# Patient Record
Sex: FEMALE | Race: ASIAN | Employment: OTHER | URBAN - METROPOLITAN AREA
[De-identification: names, ages, dates, MRNs, and addresses within clinical notes are randomized per-mention and may not be internally consistent; named-entity substitution may affect disease eponyms.]

---

## 2017-01-01 ENCOUNTER — HOSPITAL ENCOUNTER (INPATIENT)
Facility: HOSPITAL | Age: 82
LOS: 3 days | Discharge: HOME HEALTH | DRG: 689 | End: 2017-01-01
Attending: EMERGENCY MEDICINE | Admitting: HOSPITALIST
Payer: MEDICARE

## 2017-01-01 ENCOUNTER — APPOINTMENT (OUTPATIENT)
Dept: GENERAL RADIOLOGY | Facility: HOSPITAL | Age: 82
DRG: 689 | End: 2017-01-01
Attending: EMERGENCY MEDICINE
Payer: MEDICARE

## 2017-01-01 ENCOUNTER — HOSPITAL ENCOUNTER (INPATIENT)
Facility: HOSPITAL | Age: 82
LOS: 7 days | Discharge: HOME HEALTH | DRG: 100 | End: 2017-01-01
Attending: EMERGENCY MEDICINE | Admitting: HOSPITALIST
Payer: MEDICARE

## 2017-01-01 ENCOUNTER — APPOINTMENT (OUTPATIENT)
Dept: GENERAL RADIOLOGY | Facility: HOSPITAL | Age: 82
DRG: 100 | End: 2017-01-01
Attending: EMERGENCY MEDICINE
Payer: MEDICARE

## 2017-01-01 ENCOUNTER — APPOINTMENT (OUTPATIENT)
Dept: CT IMAGING | Facility: HOSPITAL | Age: 82
DRG: 100 | End: 2017-01-01
Attending: EMERGENCY MEDICINE
Payer: MEDICARE

## 2017-01-01 ENCOUNTER — APPOINTMENT (OUTPATIENT)
Dept: CV DIAGNOSTICS | Facility: HOSPITAL | Age: 82
DRG: 689 | End: 2017-01-01
Attending: INTERNAL MEDICINE
Payer: MEDICARE

## 2017-01-01 ENCOUNTER — APPOINTMENT (OUTPATIENT)
Dept: CT IMAGING | Facility: HOSPITAL | Age: 82
DRG: 689 | End: 2017-01-01
Attending: EMERGENCY MEDICINE
Payer: MEDICARE

## 2017-01-01 ENCOUNTER — APPOINTMENT (OUTPATIENT)
Dept: CT IMAGING | Facility: HOSPITAL | Age: 82
DRG: 100 | End: 2017-01-01
Attending: HOSPITALIST
Payer: MEDICARE

## 2017-01-01 ENCOUNTER — APPOINTMENT (OUTPATIENT)
Dept: GENERAL RADIOLOGY | Facility: HOSPITAL | Age: 82
DRG: 100 | End: 2017-01-01
Attending: HOSPITALIST
Payer: MEDICARE

## 2017-01-01 VITALS
DIASTOLIC BLOOD PRESSURE: 95 MMHG | HEART RATE: 101 BPM | BODY MASS INDEX: 26.84 KG/M2 | HEIGHT: 60 IN | SYSTOLIC BLOOD PRESSURE: 152 MMHG | OXYGEN SATURATION: 93 % | TEMPERATURE: 100 F | WEIGHT: 136.69 LBS | RESPIRATION RATE: 20 BRPM

## 2017-01-01 VITALS
SYSTOLIC BLOOD PRESSURE: 156 MMHG | DIASTOLIC BLOOD PRESSURE: 60 MMHG | RESPIRATION RATE: 20 BRPM | WEIGHT: 141.63 LBS | OXYGEN SATURATION: 98 % | HEART RATE: 73 BPM | TEMPERATURE: 98 F | BODY MASS INDEX: 28 KG/M2

## 2017-01-01 DIAGNOSIS — N39.0 URINARY TRACT INFECTION WITHOUT HEMATURIA, SITE UNSPECIFIED: ICD-10-CM

## 2017-01-01 DIAGNOSIS — R53.1 WEAKNESS GENERALIZED: ICD-10-CM

## 2017-01-01 DIAGNOSIS — R41.82 ALTERED MENTAL STATUS, UNSPECIFIED ALTERED MENTAL STATUS TYPE: Primary | ICD-10-CM

## 2017-01-01 DIAGNOSIS — E87.1 HYPONATREMIA: ICD-10-CM

## 2017-01-01 DIAGNOSIS — N30.00 ACUTE CYSTITIS WITHOUT HEMATURIA: Primary | ICD-10-CM

## 2017-01-01 PROCEDURE — 99223 1ST HOSP IP/OBS HIGH 75: CPT | Performed by: HOSPITALIST

## 2017-01-01 PROCEDURE — 99223 1ST HOSP IP/OBS HIGH 75: CPT | Performed by: OTHER

## 2017-01-01 PROCEDURE — 71010 XR CHEST AP PORTABLE  (CPT=71010): CPT | Performed by: EMERGENCY MEDICINE

## 2017-01-01 PROCEDURE — 4B02XSZ MEASUREMENT OF CARDIAC PACEMAKER, EXTERNAL APPROACH: ICD-10-PCS | Performed by: INTERNAL MEDICINE

## 2017-01-01 PROCEDURE — 71020 XR CHEST PA + LAT CHEST (CPT=71020): CPT | Performed by: HOSPITALIST

## 2017-01-01 PROCEDURE — 99223 1ST HOSP IP/OBS HIGH 75: CPT | Performed by: INTERNAL MEDICINE

## 2017-01-01 PROCEDURE — 99233 SBSQ HOSP IP/OBS HIGH 50: CPT | Performed by: HOSPITALIST

## 2017-01-01 PROCEDURE — 95819 EEG AWAKE AND ASLEEP: CPT | Performed by: OTHER

## 2017-01-01 PROCEDURE — 99233 SBSQ HOSP IP/OBS HIGH 50: CPT | Performed by: OTHER

## 2017-01-01 PROCEDURE — 99232 SBSQ HOSP IP/OBS MODERATE 35: CPT | Performed by: INTERNAL MEDICINE

## 2017-01-01 PROCEDURE — 99232 SBSQ HOSP IP/OBS MODERATE 35: CPT | Performed by: OTHER

## 2017-01-01 PROCEDURE — 95816 EEG AWAKE AND DROWSY: CPT | Performed by: OTHER

## 2017-01-01 PROCEDURE — 93306 TTE W/DOPPLER COMPLETE: CPT | Performed by: INTERNAL MEDICINE

## 2017-01-01 PROCEDURE — 70450 CT HEAD/BRAIN W/O DYE: CPT | Performed by: EMERGENCY MEDICINE

## 2017-01-01 PROCEDURE — 70450 CT HEAD/BRAIN W/O DYE: CPT | Performed by: HOSPITALIST

## 2017-01-01 PROCEDURE — 99239 HOSP IP/OBS DSCHRG MGMT >30: CPT | Performed by: HOSPITALIST

## 2017-01-01 PROCEDURE — 99232 SBSQ HOSP IP/OBS MODERATE 35: CPT | Performed by: HOSPITALIST

## 2017-01-01 PROCEDURE — 99222 1ST HOSP IP/OBS MODERATE 55: CPT | Performed by: INTERNAL MEDICINE

## 2017-01-01 PROCEDURE — 99239 HOSP IP/OBS DSCHRG MGMT >30: CPT | Performed by: INTERNAL MEDICINE

## 2017-01-01 RX ORDER — MONTELUKAST SODIUM 10 MG/1
10 TABLET ORAL NIGHTLY
Status: DISCONTINUED | OUTPATIENT
Start: 2017-01-01 | End: 2017-01-01

## 2017-01-01 RX ORDER — MEMANTINE HYDROCHLORIDE 10 MG/1
10 TABLET ORAL 2 TIMES DAILY
COMMUNITY
End: 2017-01-01

## 2017-01-01 RX ORDER — POLYETHYLENE GLYCOL 3350 17 G/17G
17 POWDER, FOR SOLUTION ORAL DAILY PRN
Status: DISCONTINUED | OUTPATIENT
Start: 2017-01-01 | End: 2017-01-01

## 2017-01-01 RX ORDER — POTASSIUM CHLORIDE 14.9 MG/ML
20 INJECTION INTRAVENOUS ONCE
Status: COMPLETED | OUTPATIENT
Start: 2017-01-01 | End: 2017-01-01

## 2017-01-01 RX ORDER — PREGABALIN 100 MG/1
100 CAPSULE ORAL 2 TIMES DAILY
Status: DISCONTINUED | OUTPATIENT
Start: 2017-01-01 | End: 2017-01-01

## 2017-01-01 RX ORDER — DEXTROSE MONOHYDRATE 25 G/50ML
50 INJECTION, SOLUTION INTRAVENOUS
Status: DISCONTINUED | OUTPATIENT
Start: 2017-01-01 | End: 2017-01-01

## 2017-01-01 RX ORDER — SENNOSIDES 8.6 MG
17.2 TABLET ORAL NIGHTLY
Status: DISCONTINUED | OUTPATIENT
Start: 2017-01-01 | End: 2017-01-01

## 2017-01-01 RX ORDER — TRAZODONE HYDROCHLORIDE 50 MG/1
50 TABLET ORAL NIGHTLY PRN
Status: DISCONTINUED | OUTPATIENT
Start: 2017-01-01 | End: 2017-01-01

## 2017-01-01 RX ORDER — DILTIAZEM HYDROCHLORIDE 180 MG/1
180 CAPSULE, COATED, EXTENDED RELEASE ORAL DAILY
Status: ON HOLD | COMMUNITY
End: 2018-01-01 | Stop reason: ALTCHOICE

## 2017-01-01 RX ORDER — SODIUM CHLORIDE 9 MG/ML
INJECTION, SOLUTION INTRAVENOUS CONTINUOUS
Status: DISCONTINUED | OUTPATIENT
Start: 2017-01-01 | End: 2017-01-01

## 2017-01-01 RX ORDER — LOSARTAN POTASSIUM 50 MG/1
25 TABLET ORAL DAILY
COMMUNITY

## 2017-01-01 RX ORDER — POTASSIUM CHLORIDE 20 MEQ/1
40 TABLET, EXTENDED RELEASE ORAL ONCE
Status: COMPLETED | OUTPATIENT
Start: 2017-01-01 | End: 2017-01-01

## 2017-01-01 RX ORDER — METOPROLOL TARTRATE 5 MG/5ML
5 INJECTION INTRAVENOUS ONCE
Status: COMPLETED | OUTPATIENT
Start: 2017-01-01 | End: 2017-01-01

## 2017-01-01 RX ORDER — ENOXAPARIN SODIUM 100 MG/ML
40 INJECTION SUBCUTANEOUS NIGHTLY
Status: DISCONTINUED | OUTPATIENT
Start: 2017-01-01 | End: 2017-01-01

## 2017-01-01 RX ORDER — ACETAMINOPHEN 325 MG/1
650 TABLET ORAL EVERY 6 HOURS PRN
Status: DISCONTINUED | OUTPATIENT
Start: 2017-01-01 | End: 2017-01-01

## 2017-01-01 RX ORDER — SODIUM CHLORIDE 9 MG/ML
1000 INJECTION, SOLUTION INTRAVENOUS CONTINUOUS
Status: DISCONTINUED | OUTPATIENT
Start: 2017-01-01 | End: 2017-01-01

## 2017-01-01 RX ORDER — ASPIRIN 325 MG
325 TABLET ORAL DAILY
Status: DISCONTINUED | OUTPATIENT
Start: 2017-01-01 | End: 2017-01-01

## 2017-01-01 RX ORDER — BISACODYL 10 MG
10 SUPPOSITORY, RECTAL RECTAL
Status: DISCONTINUED | OUTPATIENT
Start: 2017-01-01 | End: 2017-01-01

## 2017-01-01 RX ORDER — METOCLOPRAMIDE HYDROCHLORIDE 5 MG/ML
10 INJECTION INTRAMUSCULAR; INTRAVENOUS EVERY 8 HOURS PRN
Status: DISCONTINUED | OUTPATIENT
Start: 2017-01-01 | End: 2017-01-01

## 2017-01-01 RX ORDER — LOSARTAN POTASSIUM 50 MG/1
50 TABLET ORAL DAILY
Status: DISCONTINUED | OUTPATIENT
Start: 2017-01-01 | End: 2017-01-01

## 2017-01-01 RX ORDER — ONDANSETRON 2 MG/ML
4 INJECTION INTRAMUSCULAR; INTRAVENOUS EVERY 6 HOURS PRN
Status: DISCONTINUED | OUTPATIENT
Start: 2017-01-01 | End: 2017-01-01

## 2017-01-01 RX ORDER — TAMSULOSIN HYDROCHLORIDE 0.4 MG/1
CAPSULE ORAL DAILY
Status: ON HOLD | COMMUNITY
End: 2018-01-01 | Stop reason: ALTCHOICE

## 2017-01-01 RX ORDER — DILTIAZEM HYDROCHLORIDE 180 MG/1
180 CAPSULE, EXTENDED RELEASE ORAL DAILY
Status: DISCONTINUED | OUTPATIENT
Start: 2017-01-01 | End: 2017-01-01

## 2017-01-01 RX ORDER — ACETAMINOPHEN 650 MG/1
650 SUPPOSITORY RECTAL EVERY 4 HOURS PRN
Status: DISCONTINUED | OUTPATIENT
Start: 2017-01-01 | End: 2017-01-01

## 2017-01-01 RX ORDER — MEMANTINE HYDROCHLORIDE 5 MG/1
5 TABLET ORAL 2 TIMES DAILY
Status: DISCONTINUED | OUTPATIENT
Start: 2017-01-01 | End: 2017-01-01

## 2017-01-01 RX ORDER — MEMANTINE HYDROCHLORIDE 10 MG/1
10 TABLET ORAL 2 TIMES DAILY
Status: DISCONTINUED | OUTPATIENT
Start: 2017-01-01 | End: 2017-01-01

## 2017-01-01 RX ORDER — HYDRALAZINE HYDROCHLORIDE 20 MG/ML
10 INJECTION INTRAMUSCULAR; INTRAVENOUS EVERY 6 HOURS PRN
Status: DISCONTINUED | OUTPATIENT
Start: 2017-01-01 | End: 2017-01-01

## 2017-01-01 RX ORDER — FUROSEMIDE 10 MG/ML
20 INJECTION INTRAMUSCULAR; INTRAVENOUS ONCE
Status: COMPLETED | OUTPATIENT
Start: 2017-01-01 | End: 2017-01-01

## 2017-01-01 RX ORDER — HYDROCHLOROTHIAZIDE 25 MG/1
25 TABLET ORAL DAILY
Status: DISCONTINUED | OUTPATIENT
Start: 2017-01-01 | End: 2017-01-01

## 2017-01-01 RX ORDER — ATORVASTATIN CALCIUM 20 MG/1
20 TABLET, FILM COATED ORAL DAILY
Status: DISCONTINUED | OUTPATIENT
Start: 2017-01-01 | End: 2017-01-01

## 2017-01-01 RX ORDER — HYDRALAZINE HYDROCHLORIDE 20 MG/ML
10 INJECTION INTRAMUSCULAR; INTRAVENOUS EVERY 4 HOURS PRN
Status: DISCONTINUED | OUTPATIENT
Start: 2017-01-01 | End: 2017-01-01

## 2017-01-01 RX ORDER — SODIUM CHLORIDE 9 MG/ML
125 INJECTION, SOLUTION INTRAVENOUS CONTINUOUS
Status: DISCONTINUED | OUTPATIENT
Start: 2017-01-01 | End: 2017-01-01

## 2017-01-01 RX ORDER — LEVETIRACETAM 500 MG/1
500 TABLET ORAL 2 TIMES DAILY
Qty: 60 TABLET | Refills: 1 | Status: SHIPPED | OUTPATIENT
Start: 2017-01-01

## 2017-01-01 RX ORDER — ONDANSETRON 2 MG/ML
8 INJECTION INTRAMUSCULAR; INTRAVENOUS EVERY 6 HOURS PRN
Status: DISCONTINUED | OUTPATIENT
Start: 2017-01-01 | End: 2017-01-01

## 2017-01-01 RX ORDER — FAMOTIDINE 20 MG/1
20 TABLET ORAL DAILY
Status: DISCONTINUED | OUTPATIENT
Start: 2017-01-01 | End: 2017-01-01

## 2017-01-01 RX ORDER — CEPHALEXIN 500 MG/1
500 CAPSULE ORAL 3 TIMES DAILY
COMMUNITY
End: 2017-01-01

## 2017-01-01 RX ORDER — SODIUM PHOSPHATE, DIBASIC AND SODIUM PHOSPHATE, MONOBASIC 7; 19 G/133ML; G/133ML
1 ENEMA RECTAL ONCE AS NEEDED
Status: DISCONTINUED | OUTPATIENT
Start: 2017-01-01 | End: 2017-01-01

## 2017-01-01 RX ORDER — FAMOTIDINE 10 MG/ML
20 INJECTION, SOLUTION INTRAVENOUS DAILY
Status: DISCONTINUED | OUTPATIENT
Start: 2017-01-01 | End: 2017-01-01

## 2017-01-01 RX ORDER — LOSARTAN POTASSIUM 25 MG/1
25 TABLET ORAL DAILY
Status: DISCONTINUED | OUTPATIENT
Start: 2017-01-01 | End: 2017-01-01

## 2017-01-01 RX ORDER — VALSARTAN 80 MG/1
80 TABLET ORAL DAILY
Qty: 30 TABLET | Refills: 0 | Status: SHIPPED | OUTPATIENT
Start: 2017-01-01 | End: 2017-01-01

## 2017-01-01 RX ORDER — LABETALOL HYDROCHLORIDE 5 MG/ML
10 INJECTION, SOLUTION INTRAVENOUS EVERY 4 HOURS PRN
Status: DISCONTINUED | OUTPATIENT
Start: 2017-01-01 | End: 2017-01-01

## 2017-01-01 RX ORDER — VALSARTAN 40 MG/1
40 TABLET ORAL DAILY
Status: ON HOLD | COMMUNITY
End: 2017-01-01

## 2017-01-01 RX ORDER — DILTIAZEM HYDROCHLORIDE 60 MG/1
60 TABLET, FILM COATED ORAL EVERY 8 HOURS SCHEDULED
Status: DISCONTINUED | OUTPATIENT
Start: 2017-01-01 | End: 2017-01-01

## 2017-01-01 RX ORDER — ALFUZOSIN HYDROCHLORIDE 10 MG/1
10 TABLET, EXTENDED RELEASE ORAL
Status: DISCONTINUED | OUTPATIENT
Start: 2017-01-01 | End: 2017-01-01

## 2017-01-01 RX ORDER — LABETALOL HYDROCHLORIDE 5 MG/ML
10 INJECTION, SOLUTION INTRAVENOUS EVERY 10 MIN PRN
Status: DISCONTINUED | OUTPATIENT
Start: 2017-01-01 | End: 2017-01-01

## 2017-01-01 RX ORDER — DOCUSATE SODIUM 100 MG/1
100 CAPSULE, LIQUID FILLED ORAL 2 TIMES DAILY
Status: DISCONTINUED | OUTPATIENT
Start: 2017-01-01 | End: 2017-01-01

## 2017-01-01 RX ORDER — ENOXAPARIN SODIUM 100 MG/ML
40 INJECTION SUBCUTANEOUS DAILY
Status: DISCONTINUED | OUTPATIENT
Start: 2017-01-01 | End: 2017-01-01

## 2017-01-01 RX ORDER — ATORVASTATIN CALCIUM 80 MG/1
80 TABLET, FILM COATED ORAL NIGHTLY
Status: DISCONTINUED | OUTPATIENT
Start: 2017-01-01 | End: 2017-01-01

## 2017-01-01 RX ORDER — ACETAMINOPHEN 325 MG/1
650 TABLET ORAL EVERY 4 HOURS PRN
Status: DISCONTINUED | OUTPATIENT
Start: 2017-01-01 | End: 2017-01-01

## 2017-01-01 RX ORDER — ATORVASTATIN CALCIUM 20 MG/1
20 TABLET, FILM COATED ORAL NIGHTLY
Status: DISCONTINUED | OUTPATIENT
Start: 2017-01-01 | End: 2017-01-01

## 2017-01-01 RX ORDER — MAGNESIUM SULFATE HEPTAHYDRATE 40 MG/ML
2 INJECTION, SOLUTION INTRAVENOUS ONCE
Status: COMPLETED | OUTPATIENT
Start: 2017-01-01 | End: 2017-01-01

## 2017-01-01 RX ORDER — ASPIRIN 300 MG
300 SUPPOSITORY, RECTAL RECTAL DAILY
Status: DISCONTINUED | OUTPATIENT
Start: 2017-01-01 | End: 2017-01-01

## 2017-10-26 PROBLEM — E87.1 HYPONATREMIA: Status: ACTIVE | Noted: 2017-01-01

## 2017-10-26 PROBLEM — R53.1 WEAKNESS GENERALIZED: Status: ACTIVE | Noted: 2017-01-01

## 2017-10-26 PROBLEM — N30.00 ACUTE CYSTITIS WITHOUT HEMATURIA: Status: ACTIVE | Noted: 2017-01-01

## 2017-10-26 NOTE — ED NOTES
Report given to MAYANK Brock. Transport paged. Pt's gown/linen changed d/t wetness. Pt in no distress.

## 2017-10-26 NOTE — ED PROVIDER NOTES
Patient Seen in: [facility] Emergency Department    History   Patient presents with:  Altered Mental Status (neurologic)  Fever (infectious)    Stated Complaint: dx with UTI; more lethargic today    HPI    29-year-old [place] ([facility]) female who is brought to (!) 190/95  Pulse: 88  Resp: 24  Temp: 98.8 °F (37.1 °C)  Temp src: Temporal  SpO2: 92 %  O2 Device: None (Room air)    Current:/95 (BP Location: Right arm)   Pulse 101   Temp 99.7 °F (37.6 °C) (Oral)   Resp 20   Ht 152.4 cm (5')   Wt 62 kg   SpO2 93 within normal limits   BASIC METABOLIC PANEL (8) - Abnormal; Notable for the following:     Glucose 105 (*)     GFR 52 (*)     Sodium 135 (*)     All other components within normal limits   POCT GLUCOSE - Abnormal; Notable for the following:     POC Glucos limits   CBC W/ DIFFERENTIAL - Abnormal; Notable for the following:     HGB 10.8 (*)     HCT 33.4 (*)     MCV 80.3 (*)     MCH 26.0 (*)     Neutrophil Absolute Prelim 8.62 (*)     Neutrophil Absolute 8.62 (*)     Monocyte Absolute 1.25 (*)     All other co    VENTRICLES/SULCI: Timothy Lout is mild to moderate atrophy with significant enlargement of ventricles are proportion to the sulci which may represent normal pressure hydrocephalus in the appropriate clinical setting.   INTRACRANIAL: [place] Lout is moderate to sever Patient will be admitted to the floor for further workup treatment and evaluation because of her generalized weakness and urinary tract infection.         Disposition and Plan     Clinical Impression:  Acute cystitis without hematuria  (primary encounter di

## 2017-10-26 NOTE — PROGRESS NOTES
NURSING ADMISSION NOTE      Patient admitted via Cart  Oriented to room. Safety precautions initiated. Bed in low position. Call light in reach. Patient can not speak english. Daughter/son/grandson at bedside. Daughter to translate.  PIV in right

## 2017-10-26 NOTE — ED NOTES
Medication list reviewed with family member. Hydrodiuril was cancelled, family no longer on this medication. New orders are pending.  Pt continues to be lethargic, awakens to verbal and tactile stimuli, but family reports pt did not receive her normal medic

## 2017-10-26 NOTE — H&P
[facility]  [facility] Physical     [facility] Patient Status:  Inpatient    1935 MRN [id number]   [facility] 4NW-A Attending Hao Head MD   Hosp Day # 0 PCP Jeff Fernandez MD     Chief Complaint: AMS    Hist MG Oral Tab Take 25 mg by mouth 2 (two) times daily. Disp:  Rfl:    Montelukast Sodium (SINGULAIR) 10 MG Oral Tab Take 10 mg by mouth nightly. Disp:  Rfl:    Pregabalin (LYRICA) 100 MG Oral Cap Take 100 mg by mouth 2 (two) times daily.  Disp:  Rfl:    Multi 1. Acute cystitis without hematuria   1. Will place on meropenem   2. Awaiting urine cx  3. Monitor for fever and WBC  4. IVF  2. Acute encephalopathy due to #1  1.  Seems to be improving with hydration   2. ? NPH on her CT Head, can consider MRI brain

## 2017-10-27 NOTE — PLAN OF CARE
GENITOURINARY - ADULT    • Absence of urinary retention Progressing        METABOLIC/FLUID AND ELECTROLYTES - ADULT    • Glucose maintained within prescribed range Progressing    • Electrolytes maintained within normal limits Progressing        MUSCULOSKEL

## 2017-10-27 NOTE — PAYOR COMM NOTE
--------------  ADMISSION REVIEW     Payor: MEDICARE Simón Leach  Subscriber #:  [id number]  Authorization Number: N/A    Admit date: 10/26/17  Admit time: 1975 Leilani Muniz       Admitting Physician: Annie Cedillo MD  Attending Physician:  Irina Espino MD  Primary CHOLECYSTECTOMY  No date: OTHER SURGICAL HISTORY      Comment: thigh and knee fx on left  No date: PACEMAKER[NB.2]    Social History:[NB.1]  reports that she has never smoked.  She has never used smokeless tobacco. She reports that she does not drink alcoho neurological deficits. CNII-XII grossly intact. Musculoskeletal: Moves all extremities. Extremities: No edema or cyanosis. Integument: No rashes or lesions. Psychiatric: Appropriate mood and affect.       Diagnostic Data:      Labs:[NB.1]  Recent Labs Date Action Dose Route User    10/26/2017 1638 New Bag 1 g Intravenous Rai WORRELL, RN      diltiazem (CARDIZEM CD) 24 hr cap 180 mg     Date Action Dose Route User    10/26/2017 1900 Given 180 mg Oral Mary Hart, RN      Enoxaparin Sodium (Lethia Stephanie infusion     Date Action Dose Route User    10/27/2017 0553 New Bag (none) Intravenous Schuyler Wood RN    73/47/6286 1815 New Bag (none) Intravenous Guera Obando RN      0.9%  NaCl infusion     Date Action Dose Route User    10/27/2017 1200 Re

## 2017-10-27 NOTE — PHYSICAL THERAPY NOTE
PT attempted to see the pt for an eval this pm, however pt recently t/f to the [facility] in [address] [place] [address] from 421 to r/o CVA. Pt now on stroke protocol and bedrest until noon on 10/28/17. RN aware. Will re-attempt when medically appropriate on 10/28/17.

## 2017-10-27 NOTE — PROGRESS NOTES
[facility] Pharmacy Note:  Renal Adjustment for Merrem (meropenem)    Loretta Wilson is a 80year old female who has been prescribed Merrem (meropenem) 500 mg every 8 hrs. CrCl is estimated creatinine clearance is 45.2 mL/min (based on SCr of 0.69 mg/dL).  so

## 2017-10-27 NOTE — SLP NOTE
ADULT SWALLOWING EVALUATION    ASSESSMENT    ASSESSMENT/OVERALL IMPRESSION:  Patient seen to assess swallow function as she was noted to have AMS and drooling. Patient daughter present and contributed to history.   Patient with history of increased oral ho incontinence    Hyponatremia    Weakness generalized      Past Medical History  Past Medical History:   Diagnosis Date   • CORONARY ARTERY DISEASE    • Dementia    • Other and unspecified hyperlipidemia    • Type II or unspecified type diabetes mellitus wi prep/hold/transit time)  Mastication: Impaired (increased time)  Retention: Intact (none)    Pharyngeal Phase of Swallow: Impaired  Laryngeal Elevation Timing: Appears impaired (suspect delay)  Laryngeal Elevation Strength: Appears intact  Laryngeal Elevat

## 2017-10-27 NOTE — PROGRESS NOTES
[facility]  Progress Note    Avatod GloriaChris Patient Status:  Inpatient    1935 MRN [id number]   [facility] 4NW-A Attending Lucy Mack MD   Hosp Day # 1 PCP Teofilo Palacio MD     CC: Altered mental status    SUBJECTIVE: atraumatic  Throat: oral mucosa moist  Neck: no adenopathy, no carotid bruit, no JVD  Lungs: clear to auscultation bilaterally  Heart: S1, S2 normal, no murmur,  regular rate and rhythm  Abdomen: soft, non-tender; bowel sounds normal  Extremities: extremit collections. No midline shift. Vascular calcification the skull base is mild. Craniocervical junction is intact. SINUSES:           No sign of acute sinusitis.   There is a 1.6 cm right sided maxillary mucous retention cyst.  MASTOIDS:          No sign o 100 mg Oral BID   Alfuzosin HCl ER (UROXATRAL) 24 hr tab 10 mg 10 mg Oral Daily with breakfast   Losartan Potassium (COZAAR) tab 25 mg 25 mg Oral Daily   glucose (DEX4) oral liquid 15 g 15 g Oral Q15 Min PRN   Or      Glucose-Vitamin C (DEX-4) 4-0.006 g ch pressure  7. Hyperlipidemia–continue statin            Quality:  · DVT Prophylaxis: SCD, subcutaneous Lovenox  · CODE status: Full  · Mcclure: no  · Central line: no    Estimated date of discharge:  To be decided  Discharge is dependent on: Clinical progress

## 2017-10-27 NOTE — CONSULTS
[facility]  Consultation Note      Bekah Chisholm Patient Status:  Inpatient    1935 MRN [id number]   [facility] [place] 7NE-A Attending Daisha Lane MD   Hosp Day # 1 PCP Hector Sandoval MD     Outpatient cardiolog infusion  Intravenous Continuous   acetaminophen (TYLENOL) tab 650 mg 650 mg Oral Q4H PRN   Or      acetaminophen (TYLENOL) 650 MG rectal suppository 650 mg 650 mg Rectal Q4H PRN   Labetalol HCl (TRANDATE) injection 10 mg 10 mg Intravenous Q10 Min PRN   as Subcutaneous Daily   Insulin Aspart Pen (NOVOLOG) 100 UNIT/ML flexpen 2-10 Units 2-10 Units Subcutaneous TID CC and HS   meropenem (MERREM) IVPB 500 mg/100 ml in 0.9% NaCl minibag 500 mg Intravenous Q12H   influenza virus vaccine (FLUAD) ages 72 years and 130 lb (59 kg)      General: frail, elderly female; non-verbal.  in no acute distress  HEENT: Extraocular movements are intact; sclerae are anicteric; scalp is atrauamatic; no thyromegaly  Neck: Supple; no JVD; no carotid bruits  Cardiac: Regular rate and

## 2017-10-27 NOTE — PLAN OF CARE
GENITOURINARY - ADULT    • Absence of urinary retention Progressing        Impaired Swallowing    • Minimize aspiration risk Progressing        METABOLIC/FLUID AND ELECTROLYTES - ADULT    • Glucose maintained within prescribed range Progressing    • Electr

## 2017-10-27 NOTE — PROGRESS NOTES
Assumed care of patient at 1200- tx from [facility] floor  AOx1- person,dementia, 2L NC, Vpaced/SR on tele  Denies pain, appears comfortable  Neuro checks q 2 hours per stroke protocol  Right arm appears slightly weaker than left arm, difficult to fully assess

## 2017-10-27 NOTE — DIETARY NOTE
NUTRITION INITIAL ASSESSMENT    Pt is at moderate nutrition risk. Pt does not meet malnutrition criteria.     NUTRITION DIAGNOSIS/PROBLEM:    Inadequate oral intake related to inability to consume sufficient energy as evidenced by NPO, hx of poor po intake Fluid Accumulation: no edema noted in MD note    NUTRITION PRESCRIPTION:  Calories: 7420-2088 calories/day (22-27 calories per kg)  Protein: 74-93 grams protein/day (1.2-1.5 grams protein per kg)  Fluid: ~1 ml/kcal or per MD discretion    MONITOR AND EVALU

## 2017-10-28 NOTE — PLAN OF CARE
Assumed care at [address] [facility]. AOx1 - self. Baseline per patient's family. Hypertensive. Labetalol x2 and Hydralazine given. BP now < 170. NSR per tele. Neuro checks as ordered. Only able to fully complete the first two with help of family to translate.  No de

## 2017-10-28 NOTE — PROGRESS NOTES
[facility]  Progress Note    Lolita Mello Patient Status:  Inpatient    1935 MRN [id number]   [facility] 4NW-A Attending Nidia Fam MD   Hosp Day # 2 PCP Tash Barber MD     CC: Altered mental status    SUBJECTIVE: P Or      acetaminophen (TYLENOL) 650 MG rectal suppository 650 mg 650 mg Rectal Q4H PRN   Labetalol HCl (TRANDATE) injection 10 mg 10 mg Intravenous Q10 Min PRN   aspirin 300 MG rectal suppository 300 mg 300 mg Rectal Daily   Or      aspirin tab 325 mg 32 Subcutaneous TID CC and HS   meropenem (MERREM) IVPB 500 mg/100 ml in 0.9% NaCl minibag 500 mg Intravenous Q12H   influenza virus vaccine (FLUAD) ages 72 years and older inj 0.5ml 0.5 mL Intramuscular Prior to discharge   Labetalol HCl (TRANDATE) injection murmur,  regular rate and rhythm  Abdomen: soft, non-tender; bowel sounds normal  Extremities: extremities normal,no cyanosis or edema  Pulses: 2+ and symmetric  Skin: Skin color, texture, turgor normal. No rashes or lesions  Neurologic: Awake,alert, demen planning for today after seen by PT OT, patient's daughter states she prefers to take patient home with home health PT if possible, after seen by PT.     Advised follow-up with his regular primary care physician Jyoti Toro MD within 1 week in office

## 2017-10-28 NOTE — PROGRESS NOTES
[id number] [facility] Progress Note    Bin Mcgraw Patient Status:  Inpatient    1935 MRN [id number]   [facility] 7NE-A Attending Pavan Pimentel MD   Hosp Day # 2 PCP Prerna Bocanegra MD         Subjective:  Riaz Hogue • aspirin  325 mg Oral Daily   • Senna  17.2 mg Oral Nightly   • docusate sodium  100 mg Oral BID   • famoTIDine  20 mg Oral Daily    Or   • famoTIDine  20 mg Intravenous Daily   • atorvastatin  20 mg Oral Daily   • DilTIAZem HCl ER Coated Beads  180 mg above note with following additons:     S:  Pt. Was seen and examined in bed this am. No acute events overnight. Lethargic today but close top baseline per daughter.      O:  BP (!) 161/69 (BP Location: Right arm)   Pulse 85   Temp 97.6 °F (36.4 °C) (Oral)

## 2017-10-28 NOTE — PLAN OF CARE
Impaired Swallowing    • Minimize aspiration risk Progressing        PAIN - ADULT    • Verbalizes/displays adequate comfort level or patient's stated pain goal Progressing        Patient/Family Goals    • Patient/Family Long Term Goal Progressing    • Tamia

## 2017-10-28 NOTE — CONSULTS
Neurology H&P    Bijal Matos Patient Status:  Inpatient    1935 MRN [id number]   [facility] 7NE-A Attending Adriana Anna MD   Hosp Day # 1 PCP Jeff Fernandez MD     Subjective:  Bijal Matos is a(n) 80year old female generalized      PMHx:  Past Medical History:   Diagnosis Date   • CORONARY ARTERY DISEASE    • Dementia    • Other and unspecified hyperlipidemia    • Type II or unspecified type diabetes mellitus without mention of complication, not stated as uncontrolle for acute infarct an MRI of the brain be recommended. 2. Enlargement of the ventricles out of proportion to the sulci which may represent normal pressure hydrocephalus in the appropriate clinical setting. Assessment:   This is an 81 y/o female w

## 2017-10-28 NOTE — PROGRESS NOTES
[facility]  Progress Note    Mechelle Rowe Patient Status:  Inpatient    1935 MRN [id number]   [facility] 7NE-A Attending Ayla Haque MD   Hosp Day # 2 PCP Jyoti Toro MD     Subjective:   Lethargic. Known Allergies    Physical Exam:   General:  Well-developed / Well-nourished. No acute distress. HEENT:  Normocephalic. Atraumatic. No icterus. Neck:  There is no jugular venous distention.    Cardiovascular:  Cardiovascular examination demonstrates a abnormal left ventricular     relaxation - grade 1 diastolic dysfunction. 2. Aortic valve: There was moderate to severe stenosis. Mild regurgitation. 3. Mitral valve: Mildly to moderately calcified annulus.  Mitral valve     demonstrates moderately thicke

## 2017-10-28 NOTE — PROGRESS NOTES
10/28/17 1829   Clinical Encounter Type   Visited With Family   Routine Visit ( responded to request for spiritual counseling.)   Continue Visiting (Son was with his mother. he said she had been sleeping all day. PT LIVES WITH HER DAUGHTER.

## 2017-10-28 NOTE — PAYOR COMM NOTE
--------------  CONTINUED STAY REVIEW    Payor: MEDICARE Simón Leach  Subscriber #:  [id number]  Authorization Number: N/A    Admit date: 10/26/17  Admit time: 1975 Leilani Muniz    Admitting Physician: Annie Cedillo MD  Attending Physician:  Irina Espino MD  [facility] 10/28/2017 0439 Given 10 mg Intravenous Desean Castillo RN      Insulin Aspart Pen (NOVOLOG) 100 UNIT/ML flexpen 2-10 Units     Date Action Dose Route User    10/27/2017 2100 Given 2 Units Subcutaneous (Left Upper Abdomen) Desean Castillo RN Procedures: 10/27/17 EEG results pending      ASSESSMENT / PLAN:      1. Acute encephalopathy  1. initially suspected 2/2 UTI however urine cx NGTD  2. Unclear etiology, calm/cooperative and oriented to self today.     3. Sodium stable at 133, afebrile, CXR

## 2017-10-28 NOTE — DISCHARGE SUMMARY
[facility]  DISCHARGE SUMMARY     Judi Le Patient Status:  Inpatient    1935 MRN [id number]   [facility] 7NE-A Attending Raiza Nava MD   Hosp Day # 3 PCP Felisha Coy MD     Date of Admission: 10/26/2017  Juan J past.  She was admitted for suspected encephalopathy 2/2 UTI. CT brain no acute findings possible enlargement of ventricles. Neurology/cardiology evaluated. She was started on meropenem IV after cultures obtained. She continued on IVF and AMS improved.  E daily. Refills:  0     LYRICA 100 MG Caps  Generic drug:  pregabalin  Notes to patient:  Hold if patient is drowsy. Take 100 mg by mouth 2 (two) times daily.    Refills:  0     Memantine HCl 10 MG Tabs  Commonly known as:  NAMENDA      Take 10 mg by 26.69 kg/m²         General appearance: alert and lethargic  Head: Normocephalic, without obvious abnormality, atraumatic  Throat: oral mucosa moist  Neck: no adenopathy, no carotid bruit, no JVD  Lungs: clear to auscultation bilaterally  Heart: S1, S2 nor is an abnormal study recorded in the awake and drowsy states showing a background slowing into the theta range and frequent triphasic waves throughout the record which is consistent with a moderate encephalopathy. Triphasic waves while not specific can be

## 2017-10-28 NOTE — PHYSICAL THERAPY NOTE
Attempted to evaluate but however pt was sleeping and drowsy. Pt's family is concerned about not having adequate support at home to take care of the patient in case if she is discharged. Pt will be attempted for evaluation if medically appropriate.

## 2017-10-28 NOTE — PROCEDURES
[facility] - ELECTROENCEPHALOGRAM (EEG) REPORT  Patient Name:  Julian Xiao   MRN / CSN:  [id number] / [id number]   Date of Birth / Age:  8/31/1935 /  80year old   Encounter Date:  10/28/17         METHODS:  Twenty-two electrodes were applied according to th slowing into the theta range and frequent triphasic waves throughout the record which is consistent with a moderate encephalopathy. Triphasic waves while not specific can be seen in encephalopathies of a toxic-metabolic, hepatic or uremic etiology.  No sherri

## 2017-10-29 NOTE — PLAN OF CARE
Assumed care at 0730. Pt A&O x1, self only. Drowsy this am, more interactive throughout day. Neuros q4. Follows commands. On room air. NSR on tele. Received PRN suppository for constipation. Up to commode; voided and had a BM. Pt denies pain.  Family at bed

## 2017-10-29 NOTE — PROGRESS NOTES
[facility]  Progress Note    Gratzkg Cueva Patient Status:  Inpatient    1935 MRN [id number]   [facility] [place] [facility] 7NE-A Attending Silvana Chatman MD   Hosp Day # 3 PCP Esmer Castillo MD     Subjective:   Much more al 10/29/17 0600   Gross per 24 hour   Intake                0 ml   Output              650 ml   Net             -650 ml       Wt Readings from Last 3 Encounters:  10/26/17 : 136 lb 11 oz (62 kg)  08/05/13 : 138 lb (62.6 kg)  07/25/13 : 130 lb (59 kg)      Al Lab  10/27/17   1332   PTP  14.1   INR  1.09       No results for input(s): TROP, CK in the last 72 hours. Tele: SR      Diagnostics:    Echo:     Conclusions:    1. Left ventricle:  The cavity size was normal. Wall thickness was normal.     Systolic

## 2017-10-29 NOTE — CM/SW NOTE
10/29/17 1600   CM/SW Referral Data   Referral Source Physician   Reason for Referral Discharge planning   Informant Children   Pertinent Medical Hx   Primary Care Physician Name dr Ary Goldmann Drug/Alcohol Use n   Major Ch

## 2017-10-29 NOTE — PLAN OF CARE
Assumed care at [address]. Extremely drowsy for initial assessment. Unable to participate in neuro assessment. Able to keep eyes open for short length of time. Unable to administer evening meds for risk of aspiration.  Patient arousability progressed slowly t

## 2017-10-29 NOTE — PHYSICAL THERAPY NOTE
PHYSICAL THERAPY EVALUATION - INPATIENT     Room Number: 9061/8073-Z  Evaluation Date: 10/29/2017  Type of Evaluation: Initial  Physician Order: PT Eval and Treat    Presenting Problem: s/p AMS 10/26/17  Reason for Therapy: Mobility Dysfunction and Dis PAIN ASSESSMENT  Ratin          COGNITION  · Overall Cognitive Status:  Impaired  · Attention Span:  difficulty attending to directions  · Following Commands:  follows one-step commands inconsistently  · Motor Planning: impaired    RANGE OF MOTION forward. Pt following commands approx 50% of the time. Pt held posture at EOB for approximately 8 minutes, then became increasingly fatigued. Performed sitting to supine with Max A for B Le support into bed and for trunk control.  Educated daughter on benef training;Strengthening;Stair training;Transfer training;Balance training  Rehab Potential : Fair  Frequency (Obs): 5x/week  Number of Visits to Meet Established Goals: 5      CURRENT GOALS    Goal #1 Patient is able to demonstrate supine - sit EOB @ level:

## 2017-10-29 NOTE — CM/SW NOTE
10/29/17 1600   Discharge disposition   Discharged to: Home-Health   Name of Facillity/Home Care/Hospice ([facility])

## 2017-10-29 NOTE — PROGRESS NOTES
[facility]  Progress Note    Cristopher Dire Patient Status:  Inpatient    1935 MRN [id number]   [facility] 4NW-A Attending Shivam Hoffman MD   Hosp Day # 3 PCP Ginny Whitfield MD     CC: Altered mental status    SUBJECTIVE: Value Date   CREATSERUM 1.01 10/29/2017   BUN 18 10/29/2017    10/29/2017   K 4.6 10/29/2017    10/29/2017   CO2 27.0 10/29/2017    10/29/2017   CA 8.7 10/29/2017   PGLU 168 10/29/2017       Imaging:  CT BRAIN OR HEAD (80018)     COMPARI in the appropriate clinical setting. Dictated by: Virginia Barrett MD on 10/26/2017 at 15:52       CHEST AP PORTABLE (CPT=71010)     TECHNIQUE:  AP chest radiograph was obtained.      COMPARISON:  [facility]I [facility]O CHEST AP PORTABLE  (CPT=71010), 7/30 atorvastatin (LIPITOR) tab 20 mg 20 mg Oral Daily   diltiazem (CARDIZEM CD) 24 hr cap 180 mg 180 mg Oral Daily   Memantine HCl (NAMENDA) tab 10 mg 10 mg Oral BID   metoprolol Tartrate (LOPRESSOR) tab 25 mg 25 mg Oral BID   Montelukast Sodium (SINGULAIR) encephalopathies of a toxic-metabolic, hepatic or uremic etiology. No clear seizures are recorded. 6. Seen by neurology.   2.  UTI at home -repeat urine culture and sensitivity in hospital with no growth, but patient's daughter states that patient was al

## 2017-12-16 PROBLEM — T83.511A URINARY TRACT INFECTION ASSOCIATED WITH INDWELLING URETHRAL CATHETER (HCC): Status: ACTIVE | Noted: 2017-01-01

## 2017-12-16 PROBLEM — R41.82 ALTERED MENTAL STATUS, UNSPECIFIED ALTERED MENTAL STATUS TYPE: Status: ACTIVE | Noted: 2017-01-01

## 2017-12-16 PROBLEM — N39.0 URINARY TRACT INFECTION ASSOCIATED WITH INDWELLING URETHRAL CATHETER (HCC): Status: ACTIVE | Noted: 2017-01-01

## 2017-12-16 PROBLEM — R41.82 ALTERED MENTAL STATUS: Status: ACTIVE | Noted: 2017-01-01

## 2017-12-16 PROBLEM — N39.0 URINARY TRACT INFECTION WITHOUT HEMATURIA, SITE UNSPECIFIED: Status: ACTIVE | Noted: 2017-01-01

## 2017-12-16 PROBLEM — R73.9 HYPERGLYCEMIA: Status: ACTIVE | Noted: 2017-01-01

## 2017-12-16 PROBLEM — D64.9 ANEMIA: Status: ACTIVE | Noted: 2017-01-01

## 2017-12-16 NOTE — CONSULTS
[facility] Dosing Service    Initial Pharmacokinetic Consult for Aminoglycoside Dosing      Will Alison is a 80year old female who is being treated for UTI. Pharmacy has been asked to dose gentamicin x 1 dose  by Dr. Alyssia Guzman.     She has No Know

## 2017-12-16 NOTE — ED PROVIDER NOTES
Patient Seen in: [facility] Emergency Department    History   Patient presents with:  Altered Mental Status (neurologic)    Stated Complaint: ALTERED LOC    HPI    Patient is an 80-year-old female presented to emergency room for evaluation of altere vital signs reviewed. All other systems reviewed and negative except as noted above.     Physical Exam   ED Triage Vitals [12/16/17 1208]  BP: 129/77  Pulse: 76  Resp: 17  Temp: (!) 97.4 °F (36.3 °C)  Temp src: Temporal  SpO2: 95 %  O2 Device: None (Ro PHOSPHORUS - Normal   AMMONIA, PLASMA - Normal   CBC WITH DIFFERENTIAL WITH PLATELET    Narrative: The following orders were created for panel order CBC WITH DIFFERENTIAL WITH PLATELET.   Procedure                               Abnormality         Sta Date: 12/16/2017  PROCEDURE:  XR CHEST AP PORTABLE (CPT=71010)  TECHNIQUE:  AP chest radiograph was obtained. COMPARISON:  [facility] XR CHEST AP PORTABLE  (CPT=71010), 10/26/2017, 14:41.   INDICATIONS:  ALTERED LOC  PATIENT STATED HISTORY: (As transcribed b (Principal)Altered mental status, unspecified altered mental status type R41.82 12/16/2017     Altered mental status R41.82 12/16/2017 Unknown    Anemia D64.9 12/16/2017 Yes    Hyperglycemia R73.9 12/16/2017 Yes    Hyponatremia E87.1 10/26/2017     Urinary

## 2017-12-16 NOTE — H&P
[facility]  History and Physical     Bekah Chisholm Patient Status:  Inpatient    1935 MRN [id number]   [facility] 4NW-A Attending Rogerio Nava MD   Hosp Day # 0 PCP Hector Sandoval MD     Chief Complaint: ams    Hist by mouth 2 (two) times daily. Disp:  Rfl:    Montelukast Sodium (SINGULAIR) 10 MG Oral Tab Take 10 mg by mouth nightly. Disp:  Rfl:    Pregabalin (LYRICA) 100 MG Oral Cap Take 100 mg by mouth 2 (two) times daily.  Disp:  Rfl:    Multiple Vitamins-Minerals ( elizabeth  2. Hyponatremia-gentle fluids  3. DMII-insulin ordered-monitor sugars  4. Cad with hx pacemaker  5. Advanced dementia  6. HTN  7. HL  8. Moderate to severe Aortic stenosis  9. Hx esbl  10.  Anemia-monitor    Quality:  · DVT Prophylaxis: lovenox  · CO

## 2017-12-17 NOTE — SLP NOTE
ADULT SWALLOWING EVALUATION    ASSESSMENT    ASSESSMENT/OVERALL IMPRESSION:  Patient admitted on 12/16/17 per admitting note,  Brenden Conley is a 80year old female with ams. Yesterday became more lethargic and sleeping more and not speaking now.   Sh liquids via spoon, cup, and straw, puree and oral medications.    Oral phase revealed poor retrieval, poor acceptance, impaired propulsion and transit of puree items with patient holding puree in her oral cavity and no attempt at clearing eventually requiri Recommendations/Plan: 24 hour care/supervision    HISTORY   MEDICAL HISTORY  Reason for Referral: Altered diet consistency; Other (Comment) (patient with advanced dementia, pocketing foods)    Problem List  Principal Problem:    Acute cystitis without hemat Impaired    Pharyngeal Phase of Swallow: Impaired  Laryngeal Elevation Timing: Appears impaired  Laryngeal Elevation Strength: Appears impaired  Laryngeal Elevation Coordination: Appears impaired  (Please note: Silent aspiration cannot be evaluated clinica

## 2017-12-17 NOTE — DIETARY NOTE
[facility]    NUTRITION INITIAL ASSESSMENT    Pt does not meet malnutrition criteria.     NUTRITION DIAGNOSIS/PROBLEM:    Inadequate oral intake related to inability to consume sufficient energy as evidenced by estimated energy intake does not meet est This is 131% of IBW  BMI: Body mass index is 25.68 kg/m².   IBW: 45.5 kg  Usual Body Wt: Unknown at this time    WEIGHT HISTORY:   Patient Weight for the past 72 hrs:   Weight   12/16/17 1208 62 kg (136 lb 11 oz)   12/16/17 1631 60.2 kg (132 lb 11.2 oz)   1

## 2017-12-17 NOTE — PROGRESS NOTES
[facility]  Progress note     Franko Stiles Patient Status:  Inpatient    1935 MRN [id number]   [facility] 4NW-A Attending Jose Greene MD   Hosp Day # 1 PCP Lexi Vazquez MD     Chief Complaint: ams  Subjective: m 75cc/hr  3. DMII-insulin ordered-monitor sugars  4. Cad with hx pacemaker  5. Advanced dementia  6. HTN  7. HL  8. Moderate to severe Aortic stenosis  9. Hx esbl  10.  Anemia-monitor    Quality:  · DVT Prophylaxis: lovenox  · CODE status: full  · [place]: ens

## 2017-12-17 NOTE — PLAN OF CARE
NEUROLOGICAL - ADULT    • Achieves stable or improved neurological status Not Progressing          GENITOURINARY - ADULT    • Absence of urinary retention Progressing        Impaired Swallowing    • Minimize aspiration risk Progressing        METABOLIC/FLU

## 2017-12-17 NOTE — PLAN OF CARE
NEUROLOGICAL - ADULT    • Achieves stable or improved neurological status Not Progressing          GENITOURINARY - ADULT    • Absence of urinary retention Progressing        Impaired Activities of Daily Living    • Achieve highest/safest level of independe

## 2017-12-17 NOTE — OCCUPATIONAL THERAPY NOTE
OCCUPATIONAL THERAPY EVALUATION - INPATIENT     Room Number: 415/415-A  Evaluation Date: 12/17/2017  Type of Evaluation: Initial  Presenting Problem:  (UTI, hyponatremia, acute encephalopathy, delirium)    Physician Order: IP Consult to Hudson Muñoz Equipment:  (No shower on first floor, giving sponge bath)             Drives: No  Patient Regularly Uses: None    Prior Level of Function:  Pt is unreliable historian, daughter present to provide info.  Pt lives with daughter and son-in-law, has 25 hr care Putting on and taking off regular lower body clothing?: A Lot  -   Bathing (including washing, rinsing, drying)?: Total  -   Toileting, which includes using toilet, bedpan or urinal? : Total  -   Putting on and taking off regular upper body clothing?: A Lo direction following, decreased strength, endurance, sitting balance. These deficits impact the patient’s ability to participate in ADL, and functional transfers.      The patient is functioning below her previous functional level and would benefit from skil

## 2017-12-17 NOTE — PAYOR COMM NOTE
--------------  ADMISSION REVIEW     Payor: MEDICARE Justin Wilks  Subscriber #:  [id number]  Authorization Number: N/A    Admit date: 12/16/17  Admit time: 8745       Admitting Physician: Madhav Stephenson MD  Attending Physician:  MD Idalia Corado (*)     RBC URINE 6-10 (*)     Bacteria Urine 1+ (*)     Mucous Urine 1+ (*)     Non-Squamous Epithelial Small (*)     All other components within normal limits   CBC W/ DIFFERENTIAL - Abnormal; Notable for the following:     HGB 11.1 (*)     HCT 32.6 (*) esbl  9.  Anemia-monitor      MEDICATIONS ADMINISTERED IN LAST 1 DAY:  CefTRIAXone Sodium (ROCEPHIN) 1 g in sodium chloride 0.9 % 100 mL IVPB-minibag/addvantage     Date Action Dose Route User    12/17/2017 1451 New Bag 1 g Intravenous Lisa Rodriguez RN

## 2017-12-17 NOTE — PHYSICAL THERAPY NOTE
PHYSICAL THERAPY EVALUATION - INPATIENT     Room Number: 415/415-A  Evaluation Date: 12/17/2017  Type of Evaluation: Initial  Physician Order: PT Eval and Treat    Presenting Problem: UTI, hyponatremia, acute encephalopathy, delirium  Reason for Therap (son-in-law)  Drives: No  Patient Owned Equipment: Rolling walker (w/c)  Patient Regularly Uses: None    Prior Level of [place]: Pt is unreliable historian, daughter present to provide info. Pt lives with daughter and son-in-law, has 24 hr care.  Gweni Sitting down on and standing up from a chair with arms (e.g., wheelchair, bedside commode, etc.): A Lot   -   Moving from lying on back to sitting on the side of the bed?: A Lot   How much help from another person does the patient currently need. ..   -   M assist for all transfers and mobility tasks. The patient is below baseline and would benefit from skilled inpatient PT to address the above deficits to assist patient in returning to prior to level of function.  Per daughter Pt receives adequate assistance

## 2017-12-18 NOTE — PLAN OF CARE
GENITOURINARY - ADULT    • Absence of urinary retention Progressing        Johnson in place and patent. Confirmed johnson was changed w/ admission as pt w/ chronic johnson. Impaired Swallowing    • Minimize aspiration risk Progressing        Pt.  Fed breakfast

## 2017-12-18 NOTE — PROGRESS NOTES
[facility]  Progress note     Lolita Mello Patient Status:  Inpatient    1935 MRN [id number]   [facility] 4NW-A Attending Carly Holm MD   Hosp Day # 2 PCP Tash Barber MD     Chief Complaint: ams  Subjective: d improving  2. Hyponatremia-gentle fluids-improved but now stable at 132 Na; turn rate down to 75cc/hr  3. DMII-insulin ordered-monitor sugars  4. constiopation-add dulcolax supp  5. Cad with hx pacemaker  6. Advanced dementia  7. HTN  8. HL  9.  Moderate to

## 2017-12-18 NOTE — CM/SW NOTE
12/18/17 1200   CM/SW Referral Data   Referral Source Social Work (self-referral)   Reason for Referral Discharge planning   Informant Patient   Patient Info   Patient's Mental Status Confused;Memory Impairments   Discharge Needs   Anticipated D/C needs

## 2017-12-18 NOTE — PROGRESS NOTES
Family at bedside at beginning of shift. Was able to arouse patient and give her some food. This RN did not witness the family feeding the patient. Needed maximum coaching to take her medications with pudding from tray. Only one evening med given.   Pt

## 2017-12-18 NOTE — SLP NOTE
SPEECH DAILY NOTE - INPATIENT    ASSESSMENT & PLAN   ASSESSMENT  Pt contacted at bedside to assess tolerance of liquids and potential to upgrade to puree solids. Per RN, pt has taken medications crushed in puree without difficulty.  RN also reports that the consistency and thin liquids without overt signs or symptoms of aspiration with 95 % accuracy over 2 session(s).   In Progress   Goal #2 The patient/family/caregiver will demonstrate understanding and implementation of aspiration precautions and swallow str

## 2017-12-19 NOTE — PLAN OF CARE
NEUROLOGICAL - ADULT    • Achieves stable or improved neurological status Not Progressing          Impaired Swallowing    • Minimize aspiration risk Progressing        METABOLIC/FLUID AND ELECTROLYTES - ADULT    • Glucose maintained within prescribed range

## 2017-12-19 NOTE — PLAN OF CARE
GENITOURINARY - ADULT    • Absence of urinary retention Progressing        Pt's johnson in place and draining vee/ yellow urine. Pt's urine culture updated w/ sensitivity. IV antibiotics adjusted.     METABOLIC/FLUID AND ELECTROLYTES - ADULT    • Glucose ma

## 2017-12-19 NOTE — PROGRESS NOTES
[facility]  Progress note     Cristopher Dire Patient Status:  Inpatient    1935 MRN [id number]   [facility] 4NW-A Attending Alyce Ramirez MD   Hosp Day # 3 PCP Ginny Whitfield MD     Chief Complaint: ams  Subjective: d encephalopathy and delirium due to cauti (present on admit)-e.coli,susceptible to ancef; will switch to ancef today; family requesting one more night  2. Hyponatremia-at baseline, chronic-stop fluids today-monitor po intake  3.  DMII-insulin ordered-monitor

## 2017-12-20 PROBLEM — N39.0 URINARY TRACT INFECTION WITHOUT HEMATURIA: Status: ACTIVE | Noted: 2017-01-01

## 2017-12-20 NOTE — CONSULTS
[facility]  Report of Consultation    Taylor Healy Patient Status:  Inpatient    1935 MRN [id number]   [facility] 4NW-A Attending Suzanne Smith MD   Hosp Day # 4 PCP Yareli Romano MD     Reason for Consultation:  hyponatre 100 mg, Oral, BID  •  Montelukast Sodium (SINGULAIR) tab 10 mg, 10 mg, Oral, Nightly  •  metoprolol Tartrate (LOPRESSOR) tab 25 mg, 25 mg, Oral, BID  •  Memantine HCl (NAMENDA) tab 10 mg, 10 mg, Oral, BID  •  atorvastatin (LIPITOR) tab 20 mg, 20 mg, Oral, lb    General: lethargic and difficult to arouse, in no distress  HEENT: No scleral icterus, MMM  Neck: Supple, no VIRA or thyromegaly  Cardiac: Regular rate and rhythm, S1, S2 normal, 2-3/6 HSM  Lungs: Clear without wheezes, rales, rhonchi.     Abdomen: Sof 0750  12/20/17   1234   PGLU  183*  157*  189*  152*  156*           Imaging:  cxr reviewed, no acute process  CT brain reviewed    Impression/Plan:    #1.   AMS- per d/w family pt has waxing and waning mental status and does frequently have days of letharg

## 2017-12-20 NOTE — DIETARY NOTE
[facility] ASSESSMENT    Pt does not meet malnutrition criteria. NUTRITION DIAGNOSIS/PROBLEM:    Inadequate oral intake related to inability to consume sufficient energy as evidenced by pt with poor intake.      NUTRITION INTE water. Son also states that there are some days that pt does not want to eat anything at all. Son does not think patient has lost weight and if she has he does not know how much.  RD and pt's son discussed oral supplements which son would like for the patie appropriate route    MEDICATIONS:  Noted    LABS:  Noted    Pt is at moderate nutrition risk    FOLLOW-UP DATE: 12/26/17    Corinne Porras MA, RD, LDN  Pager [phone]

## 2017-12-20 NOTE — PHYSICAL THERAPY NOTE
PHYSICAL THERAPY TREATMENT NOTE - INPATIENT    Room Number: 415/415-A     Session: 1   Number of Visits to Meet Established Goals: 5    Presenting Problem: UTI, hyponatremia, acute encephalopathy, delirium     History related to current admission: Pt is 8 essential hypertension        Past Surgical History  Past Surgical History:  No date: CHOLECYSTECTOMY  No date: OTHER SURGICAL HISTORY      Comment: thigh and knee fx on left  No date: PACEMAKER    SUBJECTIVE  Pt non verbal, son present to translate.  Pt sa days when she is more alert. Per family member, pt did not sleep well last night. Pt left in bed, needs met.      THERAPEUTIC EXERCISES  Lower Extremity Ankle pumps  Hip AB/AD  Heel slides  SLR     Upper Extremity Elbow flex/ext,  - open/close and PRO #5     Goal #6     Goal Comments: Goals established on 12/17/2017  All goals ongoing

## 2017-12-20 NOTE — PROGRESS NOTES
+++++++++++++++++++++++++++++++++++++++++++++++++++++++Pt very sleepy but easily awakened when name is called out;opens eys;son at bedside and fed her late breakfast;Son stated let her sleep because she did not sleep good last nite per other brother  =resp

## 2017-12-20 NOTE — PROGRESS NOTES
Drowsy earlier in shift but more awake later and able to take medications crushed with applesauce without much difficulty, continues on antibiotics, remains afebrile, no c/o pain, comfortable at this time, continue to monitor.

## 2017-12-20 NOTE — PROGRESS NOTES
[facility]  Progress Note     Fabrizio Caul Patient Status:  Inpatient    1935 MRN [id number]   [facility] 4NW-A Attending Shruti Clark MD   Hosp Day # 4 PCP Archana Vivas MD     Chief Complaint: lethargy    S: Patient switch to ancef ; CT brain with chronic small vessel disease, moderate to severe, no acute intracranial abnl  2. Hyponatremia-at baseline, chronic-monitor po intake  3. DMII-insulin ordered- controlled  4. constipation-added dulcolax supp-resolved  5.  Cad

## 2017-12-20 NOTE — SLP NOTE
Attempted to see for dysphagia tx follow up however pt found sleeping and unable to arouse sufficiently for safe po trials. Will re-attempt as available and appropriate.     Kenneth Lima MA, [id number] [facility]  Speech Language Pathologist

## 2017-12-21 NOTE — PLAN OF CARE
Achieve highest/safest level of mobility/gait Progressing     Pt unable to work with therapy today d/t pending head CT. Minimize aspiration risk Progressing     Aspiration precautions in place. No s/s of aspiration during feeding/med administration.

## 2017-12-21 NOTE — PROCEDURES
Date of Procedure: 12/21/2017    Procedure: EEG (ELECTROENCEPHALOGRAM)     DX:  AMS  HX: PT IS A 81 Y/O FEMALE ADMITTED WITH INTERMITTENT LETHARGIC AND  AMS WHO IS BEING TREATED FOR UTI. PT HAS AN INDWELLING JORDAN WHICH WAS RECENTLY CHANGED.   INFORMATION

## 2017-12-21 NOTE — OCCUPATIONAL THERAPY NOTE
Attempted to see pt for skilled OT services this date. Pt is currently awaiting CT of the brain due to AMS. Will re-attempt 12/22 as medically appropriate.  Valentino Citron, 12/21/17, 2:22 PM

## 2017-12-21 NOTE — OCCUPATIONAL THERAPY NOTE
OCCUPATIONAL THERAPY TREATMENT NOTE - INPATIENT     Room Number: 415/415-A  Session: 1   Number of Visits to Meet Established Goals: 5    Presenting Problem:  (UTI, hyponatremia, acute encephalopathy, delirium)    History related to current admission: Per ASSESSMENT  AM-PAC ‘6-Clicks’ Inpatient Daily Activity Short Form  How much help from another person does the patient currently need…  -   Putting on and taking off regular lower body clothing?: Total  -   Bathing (including washing, rinsing, drying)?: Tot from the hospital. Pt has been assesed and is able to tolerate the demands of HHOT/PT.  This would allow for increased safety and independence in the current deficit areas of: BADL/IADL dysfunction, decreased endurance, balance, strength, ROM and functional

## 2017-12-21 NOTE — SLP NOTE
SPEECH DAILY NOTE - INPATIENT    ASSESSMENT & PLAN   ASSESSMENT  Pt seen for dysphagia tx to assess tolerance with recommended diet, ensure appropriate utilization of aspiration precautions and provide pt/family education.   Pt found lying down in bed sleep Visits to Meet Established Goals: 1    Session: 2    If you have any questions, please contact Rosemary Wright

## 2017-12-21 NOTE — PROGRESS NOTES
[facility] Neurology Note    Brenden Conley Patient Status:  Inpatient    1935 MRN [id number]   [facility] 4NW-A Attending Matt Oliveros MD   Hosp Day # 5 PCP Ana lElison MD     REASON FOR CONSULTATION:    Altered M :  Medication Losartan Potassium 50 MG Oral Tab, Sig Take 50 mg by mouth daily. , Start Date , End Date , Taking?  Yes, Authorizing Provider External/Patient, Reported    Medication Atorvastatin Calcium (LIPITOR) 20 MG Oral Tab, Sig Take 20 mg by mouth daily in isotonic solution (ANCEF) 1 GM/50ML IVPB premix 1 g 1 g Intravenous Q8H   bisacodyl (DULCOLAX) rectal suppository 10 mg 10 mg Rectal Daily PRN   metoprolol Tartrate (LOPRESSOR) tab 25 mg 25 mg Oral BID   atorvastatin (LIPITOR) tab 20 mg 20 mg Oral Night all extremities,    IMAGING:    HCT 12/16:  CONCLUSION:  No acute intracranial abnormality. Stable chronic changes. As above.     Impression    This is a 80year old female with PMH of advanced dementia, hypertension, diabetes, hyperlipidemia, chronic hyp

## 2017-12-21 NOTE — PROGRESS NOTES
Remains drowsy and lethargic thru the shift, awake and alert for brief periods conversing with son and able to take medications without difficulty, back to sleep shortly after.  V.S.S., continues on IVF, no signs of pain or distress, will continue to [facility]

## 2017-12-21 NOTE — PROGRESS NOTES
[facility]  Nephrology Progress Note    Bin Mcgraw Patient Status:  Inpatient    1935 MRN [id number]   [facility] 4NW-A Attending Eli Griffin MD   Hosp Day # 5 PCP Prerna Bocanegra MD       SUBJECTIVE:  Remains very jay DBIL, TPROT    Recent Labs   Lab  12/20/17   0750  12/20/17   1234  12/20/17   1805  12/20/17   2124  12/21/17   0733   PGLU  152*  156*  125*  146*  128*       Meds:     Current Facility-Administered Medications:  DilTIAZem HCl (CARDIZEM) tab 60 mg 60 mg mildly vol depleted yesterday with dry MM although does have ongoing edema in part due to sig hypoalbuminemia. Na much better today with gentle NS overnight. Will d/c IVF and follow     #3. UTI- due to e coli.   abx per primary service        Ebony Littlejohn

## 2017-12-22 NOTE — PROGRESS NOTES
[facility]  Progress Note     Eri Ascencio Patient Status:  Inpatient    1935 MRN [id number]   [facility] 4NW-A Attending Edmund Green MD   Hosp Day # 6 PCP Christina Felix MD     Chief Complaint: lethargy    S: Patient • Insulin Aspart Pen  1-5 Units Subcutaneous TID CC and HS       ASSESSMENT / PLAN:     1.  Acute encephalopathy and delirium due to cauti (present on admit)-e.coli,susceptible to ancef; will switch to ancef ; CT brain with chronic small vessel disease, m

## 2017-12-22 NOTE — PROGRESS NOTES
[facility]  Progress Note     Julian  Patient Status:  Inpatient    1935 MRN [id number]   [facility] 4NW-A Attending Ary Gaston MD   Hosp Day # 6 PCP Tammy Lewis MD     Chief Complaint: lethargy    S: Patient Insulin Aspart Pen  1-5 Units Subcutaneous TID CC and HS       ASSESSMENT / PLAN:     1.  Acute encephalopathy and delirium due to cauti (present on admit)-e.coli,susceptible to ancef; will switch to ancef ; CT brain with chronic small vessel disease, moder

## 2017-12-22 NOTE — CM/SW NOTE
Sent updated information to [facility]. Informed them the pt may dc over the weekend.

## 2017-12-22 NOTE — PLAN OF CARE
In bed resting,still lethargic but waking more & responding more to staff today. Still with very poor oral intake. Refuses to open her mouth when helped during meals although awake & alert during feedings. Kept comfortable,clean & dry. NO glycemic reactions no

## 2017-12-22 NOTE — PROGRESS NOTES
Pt remains drowsy and lethargic this shift, opens eyes when name called but goes back to sleep, more awake with son present and able to eat and take meds without difficulty, seizure precaution maintained without activity, continues on keppra, no signs of d

## 2017-12-22 NOTE — SLP NOTE
Attempted to see patient for dysphagia therapy. She was sleeping soundly when this therapist entered the room. She was difficult to arouse to a state in order for patient to take po presentations to participate in therapy. Will attempt as schedule permits.

## 2017-12-22 NOTE — PROGRESS NOTES
[facility]  Nephrology Progress Note    Sharnatalia Back Patient Status:  Inpatient    1935 MRN [id number]   [facility] 4NW-A Attending Mery Ferguson MD   Hosp Day # 6 PCP Stephy Gimenez MD       SUBJECTIVE:  Remains lethargic ALPHOS, TBIL, DBIL, TPROT    Recent Labs   Lab  12/21/17   1152  12/21/17   1739  12/21/17   2102  12/22/17   0657  12/22/17   1159   PGLU  130*  126*  105*  116*  123*       Meds:     Current Facility-Administered Medications:  potassium chloride 40 mEq i the low 130s. Na remains stable     #3. UTI- due to e coli.   abx per primary service        Leda Rodriguez MD  12/22/2017  1:57 PM

## 2017-12-22 NOTE — PROGRESS NOTES
[id number] [facility] Progress Note    Missy Hoover Patient Status:  Inpatient    1935 MRN [id number]   [facility] 4NW-A Attending Karissa Grace MD   Hosp Day # 6 PCP Jonathan Boateng MD     CC:  AMS    Subjective:  Andrei extra-axial fluid collection. Dilatation of the lateral and 3rd ventricles is noted. This is stable when compared to prior examination. This may be due to normal pressure hydrocephalus. No evidence of an acute intracranial process.          Assessment/P CN 2-12 intact   Motor: does not move to command but moving all extremities spontaneously  Sensory: intact to noxious stimuli throughout   Coord: unable to do   Romberg: deferred  Gait: deferred    Imaging: no new imaging; prior imaging as noted above

## 2017-12-22 NOTE — PAYOR COMM NOTE
--------------  CONTINUED STAY REVIEW    Payor: MEDICARE PART B ONLY    12/20        Reason for Consultation:  hyponatremia     History of Present Illness:  Afsaneh Ruiz is a a(n) 80year old woman with mult med probs incl dementia, hyponatremia in t

## 2017-12-23 NOTE — PLAN OF CARE
Patient's son here & fed patient. Consumed 100% of ensure. Few spoonfuls of food taken in. MOre awake & responsive now,conversing with family at bedside. Remains on IV antibiotics for UTI & Keppra for seizures. Seizure precautions observed. Fall precautions obse

## 2017-12-23 NOTE — CM/SW NOTE
Pt may DC home tonight. RN will contact [facility] ambulance [phone] to arrange ambulance if medically cleared for discharge. PCS form completed. MCA form completed on [employer] website, copy to chart.

## 2017-12-23 NOTE — PLAN OF CARE
Impaired Activities of Daily Living    • Achieve highest/safest level of independence in self care Adequate for Discharge        Impaired Functional Mobility    • Achieve highest/safest level of mobility/gait Adequate for Discharge          GENITOURINARY -

## 2017-12-23 NOTE — PROGRESS NOTES
[facility]  Nephrology Progress Note    Lolita Mello Attending:  Sabi Stafford MD       Assessment and Plan:    2) AMS- fluctuating MS due to combination of dementia, poor nutrition, UTI, etc. No other clear reversible process.  EEG noted- on Kepp imaging studies reviewed.     Meds:     Current Facility-Administered Medications:  traZODone (DESYREL) partial tablet 25 mg 25 mg Oral Nightly PRN   levETIRAcetam (KEPPRA) 500 mg in sodium chloride 0.9 % 100 mL IVPB 500 mg Intravenous Q12H   DilTIAZem HCl

## 2017-12-24 NOTE — PROGRESS NOTES
NURSING DISCHARGE NOTE    Discharged Home via Ambulance. Accompanied by Family member  Belongings Taken by patient/family. Ambulance here discharge instructions and concerns addressed, patient stable and comfortable.

## 2017-12-24 NOTE — PLAN OF CARE
Son came in to see pt and talked with dr. Pino Pelaez. Son in agreence to taking pt home. Ambulance called and will be picking up pt at [address]. Pt alert to self, follow some commands from the son. Iv discontinued. Family waiting at home for pt.  Discharge instru

## 2017-12-27 NOTE — DISCHARGE SUMMARY
[facility]  DISCHARGE SUMMARY     Rockford Fearing Patient Status:  Inpatient    1935 MRN [id number]   [facility] 4NW-A Attending No att. providers found   Hosp Day # 7 PCP Yaneth Marquis MD     Date of Admission:  descriptions):   •     Lab/Test results pending at Discharge:   ·     Consultants:  • Neurology    Discharge Medication List:     Discharge Medications      START taking these medications      Instructions Prescription details   levETIRAcetam 500 MG Tabs  C week        Vital signs:       Physical Exam:    General: No acute distress. Respiratory: Clear to auscultation bilaterally. No wheezes. No rhonchi. Cardiovascular: S1, S2. Regular rate and rhythm. No murmurs, rubs or gallops.    Abdomen: Soft, nontender

## 2018-01-01 ENCOUNTER — APPOINTMENT (OUTPATIENT)
Dept: CT IMAGING | Facility: HOSPITAL | Age: 83
DRG: 871 | End: 2018-01-01
Attending: EMERGENCY MEDICINE
Payer: MEDICARE

## 2018-01-01 ENCOUNTER — HOSPITAL ENCOUNTER (INPATIENT)
Facility: HOSPITAL | Age: 83
LOS: 2 days | DRG: 871 | End: 2018-01-01
Attending: HOSPITALIST | Admitting: HOSPITALIST
Payer: COMMERCIAL

## 2018-01-01 ENCOUNTER — APPOINTMENT (OUTPATIENT)
Dept: CT IMAGING | Facility: HOSPITAL | Age: 83
DRG: 884 | End: 2018-01-01
Attending: EMERGENCY MEDICINE
Payer: MEDICARE

## 2018-01-01 ENCOUNTER — APPOINTMENT (OUTPATIENT)
Dept: GENERAL RADIOLOGY | Facility: HOSPITAL | Age: 83
DRG: 871 | End: 2018-01-01
Attending: NURSE PRACTITIONER
Payer: MEDICARE

## 2018-01-01 ENCOUNTER — APPOINTMENT (OUTPATIENT)
Dept: GENERAL RADIOLOGY | Facility: HOSPITAL | Age: 83
DRG: 884 | End: 2018-01-01
Attending: EMERGENCY MEDICINE
Payer: MEDICARE

## 2018-01-01 ENCOUNTER — HOSPITAL ENCOUNTER (INPATIENT)
Facility: HOSPITAL | Age: 83
LOS: 3 days | Discharge: HOME HEALTH | DRG: 884 | End: 2018-01-01
Attending: EMERGENCY MEDICINE | Admitting: HOSPITALIST
Payer: MEDICARE

## 2018-01-01 ENCOUNTER — HOSPITAL ENCOUNTER (INPATIENT)
Facility: HOSPITAL | Age: 83
LOS: 4 days | Discharge: HOSPICE | DRG: 871 | End: 2018-01-01
Attending: EMERGENCY MEDICINE | Admitting: HOSPITALIST
Payer: MEDICARE

## 2018-01-01 ENCOUNTER — APPOINTMENT (OUTPATIENT)
Dept: GENERAL RADIOLOGY | Facility: HOSPITAL | Age: 83
DRG: 871 | End: 2018-01-01
Attending: EMERGENCY MEDICINE
Payer: MEDICARE

## 2018-01-01 VITALS
DIASTOLIC BLOOD PRESSURE: 61 MMHG | OXYGEN SATURATION: 100 % | WEIGHT: 113.69 LBS | TEMPERATURE: 98 F | HEIGHT: 60 IN | RESPIRATION RATE: 18 BRPM | BODY MASS INDEX: 22.32 KG/M2 | SYSTOLIC BLOOD PRESSURE: 146 MMHG | HEART RATE: 79 BPM

## 2018-01-01 VITALS
SYSTOLIC BLOOD PRESSURE: 78 MMHG | HEART RATE: 42 BPM | RESPIRATION RATE: 24 BRPM | TEMPERATURE: 99 F | HEIGHT: 63 IN | WEIGHT: 122.56 LBS | OXYGEN SATURATION: 78 % | DIASTOLIC BLOOD PRESSURE: 57 MMHG | BODY MASS INDEX: 21.71 KG/M2

## 2018-01-01 VITALS
TEMPERATURE: 98 F | RESPIRATION RATE: 16 BRPM | OXYGEN SATURATION: 92 % | DIASTOLIC BLOOD PRESSURE: 40 MMHG | HEART RATE: 112 BPM | SYSTOLIC BLOOD PRESSURE: 61 MMHG

## 2018-01-01 DIAGNOSIS — A41.89 SEPSIS DUE TO OTHER ETIOLOGY (HCC): Primary | ICD-10-CM

## 2018-01-01 DIAGNOSIS — N39.0 URINARY TRACT INFECTION ASSOCIATED WITH INDWELLING URETHRAL CATHETER, INITIAL ENCOUNTER (HCC): Primary | ICD-10-CM

## 2018-01-01 DIAGNOSIS — T83.511A URINARY TRACT INFECTION ASSOCIATED WITH INDWELLING URETHRAL CATHETER, INITIAL ENCOUNTER (HCC): Primary | ICD-10-CM

## 2018-01-01 DIAGNOSIS — K63.1 PERFORATION BOWEL (HCC): ICD-10-CM

## 2018-01-01 DIAGNOSIS — R10.9 ABDOMINAL PAIN OF UNKNOWN ETIOLOGY: ICD-10-CM

## 2018-01-01 DIAGNOSIS — F03.90 DEMENTIA WITHOUT BEHAVIORAL DISTURBANCE, UNSPECIFIED DEMENTIA TYPE (HCC): ICD-10-CM

## 2018-01-01 DIAGNOSIS — R41.82 ALTERED MENTAL STATUS, UNSPECIFIED ALTERED MENTAL STATUS TYPE: ICD-10-CM

## 2018-01-01 LAB
ALBUMIN SERPL-MCNC: 1.4 G/DL (ref 3.5–4.8)
ALBUMIN SERPL-MCNC: 1.8 G/DL (ref 3.5–4.8)
ALBUMIN SERPL-MCNC: 1.9 G/DL (ref 3.5–4.8)
ALBUMIN SERPL-MCNC: 2.1 G/DL (ref 3.5–4.8)
ALBUMIN/GLOB SERPL: 0.4 {RATIO} (ref 1–2)
ALBUMIN/GLOB SERPL: 0.7 {RATIO} (ref 1–2)
ALLENS TEST: POSITIVE
ALP LIVER SERPL-CCNC: 105 U/L (ref 55–142)
ALP LIVER SERPL-CCNC: 128 U/L (ref 55–142)
ALP LIVER SERPL-CCNC: 77 U/L (ref 55–142)
ALP LIVER SERPL-CCNC: 79 U/L (ref 55–142)
ALT SERPL-CCNC: 10 U/L (ref 14–54)
ALT SERPL-CCNC: 11 U/L (ref 14–54)
ALT SERPL-CCNC: 16 U/L (ref 14–54)
ALT SERPL-CCNC: 9 U/L (ref 14–54)
AMPHET UR QL SCN: NEGATIVE
ANION GAP SERPL CALC-SCNC: 11 MMOL/L (ref 0–18)
ANION GAP SERPL CALC-SCNC: 13 MMOL/L (ref 0–18)
ANION GAP SERPL CALC-SCNC: 18 MMOL/L (ref 0–18)
ANION GAP SERPL CALC-SCNC: 7 MMOL/L (ref 0–18)
ANION GAP SERPL CALC-SCNC: 7 MMOL/L (ref 0–18)
ANION GAP SERPL CALC-SCNC: 9 MMOL/L (ref 0–18)
APTT PPP: 28.1 SECONDS (ref 26.1–34.6)
APTT PPP: 29.2 SECONDS (ref 26.1–34.6)
APTT PPP: 33.3 SECONDS (ref 26.1–34.6)
ARTERIAL BLD GAS O2 SATURATION: 97 % (ref 92–100)
ARTERIAL BLOOD GAS BASE EXCESS: -9.1
ARTERIAL BLOOD GAS HCO3: 14.2 MEQ/L (ref 22–26)
ARTERIAL BLOOD GAS PCO2: 24 MM HG (ref 35–45)
ARTERIAL BLOOD GAS PH: 7.4 (ref 7.35–7.45)
ARTERIAL BLOOD GAS PO2: 146 MM HG (ref 80–105)
AST SERPL-CCNC: 17 U/L (ref 15–41)
AST SERPL-CCNC: 18 U/L (ref 15–41)
AST SERPL-CCNC: 19 U/L (ref 15–41)
AST SERPL-CCNC: 25 U/L (ref 15–41)
ATRIAL RATE: 104 BPM
ATRIAL RATE: 77 BPM
BAND %: 1 %
BARBITURATES UR QL SCN: NEGATIVE
BASOPHIL % MANUAL: 0 %
BASOPHIL % MANUAL: 0 %
BASOPHIL ABSOLUTE MANUAL: 0 X10(3) UL (ref 0–0.1)
BASOPHIL ABSOLUTE MANUAL: 0 X10(3) UL (ref 0–0.1)
BASOPHILS # BLD AUTO: 0.02 X10(3) UL (ref 0–0.1)
BASOPHILS # BLD AUTO: 0.04 X10(3) UL (ref 0–0.1)
BASOPHILS # BLD AUTO: 0.04 X10(3) UL (ref 0–0.1)
BASOPHILS NFR BLD AUTO: 0.1 %
BENZODIAZ UR QL SCN: NEGATIVE
BILIRUB SERPL-MCNC: 0.4 MG/DL (ref 0.1–2)
BILIRUB SERPL-MCNC: 0.4 MG/DL (ref 0.1–2)
BILIRUB SERPL-MCNC: 0.6 MG/DL (ref 0.1–2)
BILIRUB SERPL-MCNC: 0.7 MG/DL (ref 0.1–2)
BILIRUB UR QL STRIP.AUTO: NEGATIVE
BILIRUB UR QL STRIP.AUTO: NEGATIVE
BUN BLD-MCNC: 16 MG/DL (ref 8–20)
BUN BLD-MCNC: 22 MG/DL (ref 8–20)
BUN BLD-MCNC: 22 MG/DL (ref 8–20)
BUN BLD-MCNC: 23 MG/DL (ref 8–20)
BUN BLD-MCNC: 26 MG/DL (ref 8–20)
BUN BLD-MCNC: 9 MG/DL (ref 8–20)
BUN/CREAT SERPL: 10.4 (ref 10–20)
BUN/CREAT SERPL: 11.7 (ref 10–20)
BUN/CREAT SERPL: 13.3 (ref 10–20)
BUN/CREAT SERPL: 17.6 (ref 10–20)
BUN/CREAT SERPL: 20 (ref 10–20)
BUN/CREAT SERPL: 22.6 (ref 10–20)
CALCIUM BLD-MCNC: 7.3 MG/DL (ref 8.3–10.3)
CALCIUM BLD-MCNC: 7.6 MG/DL (ref 8.3–10.3)
CALCIUM BLD-MCNC: 8.1 MG/DL (ref 8.3–10.3)
CALCIUM BLD-MCNC: 8.1 MG/DL (ref 8.3–10.3)
CALCIUM BLD-MCNC: 8.6 MG/DL (ref 8.3–10.3)
CALCIUM BLD-MCNC: 9.2 MG/DL (ref 8.3–10.3)
CALCULATED O2 SATURATION: 99 % (ref 92–100)
CANNABINOIDS UR QL SCN: NEGATIVE
CARBOXYHEMOGLOBIN: 1.4 % SAT (ref 0–3)
CHLORIDE SERPL-SCNC: 104 MMOL/L (ref 101–111)
CHLORIDE SERPL-SCNC: 110 MMOL/L (ref 101–111)
CHLORIDE SERPL-SCNC: 93 MMOL/L (ref 101–111)
CHLORIDE SERPL-SCNC: 95 MMOL/L (ref 101–111)
CO2 SERPL-SCNC: 16 MMOL/L (ref 22–32)
CO2 SERPL-SCNC: 16 MMOL/L (ref 22–32)
CO2 SERPL-SCNC: 19 MMOL/L (ref 22–32)
CO2 SERPL-SCNC: 20 MMOL/L (ref 22–32)
CO2 SERPL-SCNC: 24 MMOL/L (ref 22–32)
CO2 SERPL-SCNC: 28 MMOL/L (ref 22–32)
COCAINE UR QL: NEGATIVE
COLOR UR AUTO: YELLOW
CREAT BLD-MCNC: 0.77 MG/DL (ref 0.55–1.02)
CREAT BLD-MCNC: 0.8 MG/DL (ref 0.55–1.02)
CREAT BLD-MCNC: 1.15 MG/DL (ref 0.55–1.02)
CREAT BLD-MCNC: 1.25 MG/DL (ref 0.55–1.02)
CREAT BLD-MCNC: 1.65 MG/DL (ref 0.55–1.02)
CREAT BLD-MCNC: 2.21 MG/DL (ref 0.55–1.02)
EOSINOPHIL # BLD AUTO: 0 X10(3) UL (ref 0–0.3)
EOSINOPHIL % MANUAL: 1 %
EOSINOPHIL % MANUAL: 1 %
EOSINOPHIL ABSOLUTE MANUAL: 0.17 X10(3) UL (ref 0–0.3)
EOSINOPHIL ABSOLUTE MANUAL: 0.22 X10(3) UL (ref 0–0.3)
EOSINOPHIL NFR BLD AUTO: 0 %
ERYTHROCYTE [DISTWIDTH] IN BLOOD BY AUTOMATED COUNT: 15.1 % (ref 11.5–16)
ERYTHROCYTE [DISTWIDTH] IN BLOOD BY AUTOMATED COUNT: 15.1 % (ref 11.5–16)
ERYTHROCYTE [DISTWIDTH] IN BLOOD BY AUTOMATED COUNT: 15.8 % (ref 11.5–16)
ERYTHROCYTE [DISTWIDTH] IN BLOOD BY AUTOMATED COUNT: 15.9 % (ref 11.5–16)
ERYTHROCYTE [DISTWIDTH] IN BLOOD BY AUTOMATED COUNT: 15.9 % (ref 11.5–16)
EST. AVERAGE GLUCOSE BLD GHB EST-MCNC: 157 MG/DL (ref 68–126)
ETHANOL UR-MCNC: NEGATIVE MG/DL
ETHYL ALCOHOL: <3 MG/DL (ref ?–3)
GLOBULIN PLAS-MCNC: 3.2 G/DL (ref 2.5–4)
GLOBULIN PLAS-MCNC: 4 G/DL (ref 2.5–4)
GLOBULIN PLAS-MCNC: 4.5 G/DL (ref 2.5–3.7)
GLOBULIN PLAS-MCNC: 4.5 G/DL (ref 2.5–4)
GLUCOSE BLD-MCNC: 102 MG/DL (ref 65–99)
GLUCOSE BLD-MCNC: 109 MG/DL (ref 65–99)
GLUCOSE BLD-MCNC: 110 MG/DL (ref 65–99)
GLUCOSE BLD-MCNC: 113 MG/DL (ref 65–99)
GLUCOSE BLD-MCNC: 122 MG/DL (ref 65–99)
GLUCOSE BLD-MCNC: 133 MG/DL (ref 65–99)
GLUCOSE BLD-MCNC: 136 MG/DL (ref 65–99)
GLUCOSE BLD-MCNC: 140 MG/DL (ref 65–99)
GLUCOSE BLD-MCNC: 149 MG/DL (ref 65–99)
GLUCOSE BLD-MCNC: 152 MG/DL (ref 65–99)
GLUCOSE BLD-MCNC: 153 MG/DL (ref 65–99)
GLUCOSE BLD-MCNC: 157 MG/DL (ref 65–99)
GLUCOSE BLD-MCNC: 162 MG/DL (ref 70–99)
GLUCOSE BLD-MCNC: 167 MG/DL (ref 65–99)
GLUCOSE BLD-MCNC: 168 MG/DL (ref 70–99)
GLUCOSE BLD-MCNC: 170 MG/DL (ref 65–99)
GLUCOSE BLD-MCNC: 171 MG/DL (ref 65–99)
GLUCOSE BLD-MCNC: 181 MG/DL (ref 70–99)
GLUCOSE BLD-MCNC: 181 MG/DL (ref 70–99)
GLUCOSE BLD-MCNC: 199 MG/DL (ref 65–99)
GLUCOSE BLD-MCNC: 220 MG/DL (ref 65–99)
GLUCOSE BLD-MCNC: 61 MG/DL (ref 65–99)
GLUCOSE BLD-MCNC: 73 MG/DL (ref 70–99)
GLUCOSE BLD-MCNC: 75 MG/DL (ref 65–99)
GLUCOSE BLD-MCNC: 76 MG/DL (ref 65–99)
GLUCOSE BLD-MCNC: 78 MG/DL (ref 65–99)
GLUCOSE BLD-MCNC: 84 MG/DL (ref 65–99)
GLUCOSE BLD-MCNC: 86 MG/DL (ref 65–99)
GLUCOSE BLD-MCNC: 87 MG/DL (ref 65–99)
GLUCOSE BLD-MCNC: 87 MG/DL (ref 65–99)
GLUCOSE BLD-MCNC: 88 MG/DL (ref 65–99)
GLUCOSE BLD-MCNC: 90 MG/DL (ref 65–99)
GLUCOSE BLD-MCNC: 91 MG/DL (ref 65–99)
GLUCOSE BLD-MCNC: 93 MG/DL (ref 65–99)
GLUCOSE BLD-MCNC: 98 MG/DL (ref 65–99)
GLUCOSE BLD-MCNC: 98 MG/DL (ref 70–99)
GLUCOSE UR STRIP.AUTO-MCNC: NEGATIVE MG/DL
GLUCOSE UR STRIP.AUTO-MCNC: NEGATIVE MG/DL
HAV IGM SER QL: 1.5 MG/DL (ref 1.8–2.5)
HAV IGM SER QL: 2 MG/DL (ref 1.8–2.5)
HBA1C MFR BLD HPLC: 7.1 % (ref ?–5.7)
HCT VFR BLD AUTO: 26.8 % (ref 34–50)
HCT VFR BLD AUTO: 28.9 % (ref 34–50)
HCT VFR BLD AUTO: 29.7 % (ref 34–50)
HCT VFR BLD AUTO: 32 % (ref 34–50)
HCT VFR BLD AUTO: 36.3 % (ref 34–50)
HGB BLD-MCNC: 10.1 G/DL (ref 12–16)
HGB BLD-MCNC: 10.4 G/DL (ref 12–16)
HGB BLD-MCNC: 11.8 G/DL (ref 12–16)
HGB BLD-MCNC: 9.2 G/DL (ref 12–16)
HGB BLD-MCNC: 9.4 G/DL (ref 12–16)
IMMATURE GRANULOCYTE COUNT: 0.09 X10(3) UL (ref 0–1)
IMMATURE GRANULOCYTE COUNT: 0.22 X10(3) UL (ref 0–1)
IMMATURE GRANULOCYTE COUNT: 0.25 X10(3) UL (ref 0–1)
IMMATURE GRANULOCYTE RATIO %: 0.4 %
IMMATURE GRANULOCYTE RATIO %: 0.6 %
IMMATURE GRANULOCYTE RATIO %: 0.9 %
INR BLD: 1.13 (ref 0.9–1.1)
INR BLD: 1.48 (ref 0.9–1.1)
IONIZED CALCIUM: 1.16 MMOL/L (ref 1.12–1.32)
KETONES UR STRIP.AUTO-MCNC: NEGATIVE MG/DL
KETONES UR STRIP.AUTO-MCNC: NEGATIVE MG/DL
L/M: 2 L/MIN
LACTIC ACID ARTERIAL: 5.4 MMOL/L (ref 0.5–2)
LACTIC ACID: 1.8 MMOL/L (ref 0.5–2)
LACTIC ACID: 2.7 MMOL/L (ref 0.5–2)
LACTIC ACID: 4.7 MMOL/L (ref 0.5–2)
LACTIC ACID: 6.5 MMOL/L (ref 0.5–2)
LACTIC ACID: 7.2 MMOL/L (ref 0.5–2)
LYMPHOCYTE % MANUAL: 6 %
LYMPHOCYTE % MANUAL: 7 %
LYMPHOCYTE ABSOLUTE MANUAL: 1.02 X10(3) UL (ref 0.9–4)
LYMPHOCYTE ABSOLUTE MANUAL: 1.55 X10(3) UL (ref 0.9–4)
LYMPHOCYTES # BLD AUTO: 0.54 X10(3) UL (ref 0.9–4)
LYMPHOCYTES # BLD AUTO: 0.58 X10(3) UL (ref 0.9–4)
LYMPHOCYTES # BLD AUTO: 0.65 X10(3) UL (ref 0.9–4)
LYMPHOCYTES NFR BLD AUTO: 1.6 %
LYMPHOCYTES NFR BLD AUTO: 2.4 %
LYMPHOCYTES NFR BLD AUTO: 2.5 %
M PROTEIN MFR SERPL ELPH: 5.3 G/DL (ref 6.1–8.3)
M PROTEIN MFR SERPL ELPH: 5.4 G/DL (ref 6.1–8.3)
M PROTEIN MFR SERPL ELPH: 6.3 G/DL (ref 6.1–8.3)
M PROTEIN MFR SERPL ELPH: 6.4 G/DL (ref 6.1–8.3)
MCH RBC QN AUTO: 26 PG (ref 27–33.2)
MCH RBC QN AUTO: 26.9 PG (ref 27–33.2)
MCH RBC QN AUTO: 26.9 PG (ref 27–33.2)
MCH RBC QN AUTO: 27.5 PG (ref 27–33.2)
MCH RBC QN AUTO: 27.7 PG (ref 27–33.2)
MCHC RBC AUTO-ENTMCNC: 32.5 G/DL (ref 31–37)
MCHC RBC AUTO-ENTMCNC: 34 G/DL (ref 31–37)
MCHC RBC AUTO-ENTMCNC: 34.3 G/DL (ref 31–37)
MCV RBC AUTO: 79.2 FL (ref 81–100)
MCV RBC AUTO: 80.1 FL (ref 81–100)
MCV RBC AUTO: 80.2 FL (ref 81–100)
MCV RBC AUTO: 82.9 FL (ref 81–100)
MCV RBC AUTO: 85.1 FL (ref 81–100)
METAMYELOCYTE %: 1 %
METAMYELOCYTE ABSOLUTE MANUAL: 0.22 X10(3) UL (ref ?–0.01)
METHEMOGLOBIN: 0.6 % SAT (ref 0.4–1.5)
MONOCYTE % MANUAL: 2 %
MONOCYTE % MANUAL: 6 %
MONOCYTE ABSOLUTE MANUAL: 0.34 X10(3) UL (ref 0.1–1)
MONOCYTE ABSOLUTE MANUAL: 1.33 X10(3) UL (ref 0.1–1)
MONOCYTES # BLD AUTO: 0.41 X10(3) UL (ref 0.1–1)
MONOCYTES # BLD AUTO: 0.95 X10(3) UL (ref 0.1–1)
MONOCYTES # BLD AUTO: 1.23 X10(3) UL (ref 0.1–1)
MONOCYTES NFR BLD AUTO: 1.9 %
MONOCYTES NFR BLD AUTO: 3.5 %
MONOCYTES NFR BLD AUTO: 3.5 %
MORPHOLOGY: NORMAL
NEUTROPHIL ABS PRELIM: 13.88 X10 (3) UL (ref 1.3–6.7)
NEUTROPHIL ABS PRELIM: 18.41 X10 (3) UL (ref 1.3–6.7)
NEUTROPHIL ABS PRELIM: 20.54 X10 (3) UL (ref 1.3–6.7)
NEUTROPHIL ABS PRELIM: 25.56 X10 (3) UL (ref 1.3–6.7)
NEUTROPHIL ABS PRELIM: 33.33 X10 (3) UL (ref 1.3–6.7)
NEUTROPHIL ABSOLUTE MANUAL: 15.47 X10(3) UL (ref 1.3–6.7)
NEUTROPHIL ABSOLUTE MANUAL: 18.79 X10(3) UL (ref 1.3–6.7)
NEUTROPHILS # BLD AUTO: 20.54 X10(3) UL (ref 1.3–6.7)
NEUTROPHILS # BLD AUTO: 25.56 X10(3) UL (ref 1.3–6.7)
NEUTROPHILS # BLD AUTO: 33.33 X10(3) UL (ref 1.3–6.7)
NEUTROPHILS % MANUAL: 84 %
NEUTROPHILS % MANUAL: 91 %
NEUTROPHILS NFR BLD AUTO: 93.1 %
NEUTROPHILS NFR BLD AUTO: 94.2 %
NEUTROPHILS NFR BLD AUTO: 95.1 %
NITRITE UR QL STRIP.AUTO: NEGATIVE
NITRITE UR QL STRIP.AUTO: NEGATIVE
OPIATE URINE: NEGATIVE
OSMOLALITY SERPL CALC.SUM OF ELEC: 274 MOSM/KG (ref 275–295)
OSMOLALITY SERPL CALC.SUM OF ELEC: 276 MOSM/KG (ref 275–295)
OSMOLALITY SERPL CALC.SUM OF ELEC: 280 MOSM/KG (ref 275–295)
OSMOLALITY SERPL CALC.SUM OF ELEC: 281 MOSM/KG (ref 275–295)
OSMOLALITY SERPL CALC.SUM OF ELEC: 283 MOSM/KG (ref 275–295)
OSMOLALITY SERPL CALC.SUM OF ELEC: 291 MOSM/KG (ref 275–295)
P AXIS: 2 DEGREES
P-R INTERVAL: 144 MS
P-R INTERVAL: 156 MS
P/F RATIO: 704.6 MMHG
PATIENT TEMPERATURE: 97.9 F
PCP URINE: NEGATIVE
PH UR STRIP.AUTO: 5 [PH] (ref 4.5–8)
PH UR STRIP.AUTO: 5 [PH] (ref 4.5–8)
PHOSPHATE SERPL-MCNC: 2.7 MG/DL (ref 2.5–4.9)
PHOSPHATE SERPL-MCNC: 4.9 MG/DL (ref 2.5–4.9)
PLATELET # BLD AUTO: 239 10(3)UL (ref 150–450)
PLATELET # BLD AUTO: 302 10(3)UL (ref 150–450)
PLATELET # BLD AUTO: 340 10(3)UL (ref 150–450)
PLATELET # BLD AUTO: 341 10(3)UL (ref 150–450)
PLATELET # BLD AUTO: 406 10(3)UL (ref 150–450)
PLATELET MORPHOLOGY: NORMAL
PLATELET MORPHOLOGY: NORMAL
POTASSIUM BLOOD GAS: 4.1 MMOL/L (ref 3.6–5.1)
POTASSIUM SERPL-SCNC: 3.3 MMOL/L (ref 3.6–5.1)
POTASSIUM SERPL-SCNC: 3.7 MMOL/L (ref 3.6–5.1)
POTASSIUM SERPL-SCNC: 4 MMOL/L (ref 3.6–5.1)
POTASSIUM SERPL-SCNC: 4 MMOL/L (ref 3.6–5.1)
POTASSIUM SERPL-SCNC: 4.7 MMOL/L (ref 3.6–5.1)
POTASSIUM SERPL-SCNC: 4.9 MMOL/L (ref 3.6–5.1)
PROCALCITONIN SERPL-MCNC: 53.58 NG/ML
PROT UR STRIP.AUTO-MCNC: 100 MG/DL
PROT UR STRIP.AUTO-MCNC: NEGATIVE MG/DL
PSA SERPL DL<=0.01 NG/ML-MCNC: 15 SECONDS (ref 12.4–14.7)
PSA SERPL DL<=0.01 NG/ML-MCNC: 18.5 SECONDS (ref 12.4–14.7)
Q-T INTERVAL: 342 MS
Q-T INTERVAL: 374 MS
QRS DURATION: 66 MS
QRS DURATION: 72 MS
QTC CALCULATION (BEZET): 423 MS
QTC CALCULATION (BEZET): 449 MS
R AXIS: -31 DEGREES
R AXIS: 214 DEGREES
RBC # BLD AUTO: 3.34 X10(6)UL (ref 3.8–5.1)
RBC # BLD AUTO: 3.61 X10(6)UL (ref 3.8–5.1)
RBC # BLD AUTO: 3.75 X10(6)UL (ref 3.8–5.1)
RBC # BLD AUTO: 3.76 X10(6)UL (ref 3.8–5.1)
RBC # BLD AUTO: 4.38 X10(6)UL (ref 3.8–5.1)
RBC #/AREA URNS AUTO: >10 /HPF
RED CELL DISTRIBUTION WIDTH-SD: 43.2 FL (ref 35.1–46.3)
RED CELL DISTRIBUTION WIDTH-SD: 44.2 FL (ref 35.1–46.3)
RED CELL DISTRIBUTION WIDTH-SD: 46.1 FL (ref 35.1–46.3)
RED CELL DISTRIBUTION WIDTH-SD: 48.1 FL (ref 35.1–46.3)
RED CELL DISTRIBUTION WIDTH-SD: 50 FL (ref 35.1–46.3)
SODIUM BLOOD GAS: 126 MMOL/L (ref 136–144)
SODIUM SERPL-SCNC: 128 MMOL/L (ref 136–144)
SODIUM SERPL-SCNC: 129 MMOL/L (ref 136–144)
SODIUM SERPL-SCNC: 133 MMOL/L (ref 136–144)
SODIUM SERPL-SCNC: 134 MMOL/L (ref 136–144)
SODIUM SERPL-SCNC: 135 MMOL/L (ref 136–144)
SODIUM SERPL-SCNC: 139 MMOL/L (ref 136–144)
SP GR UR STRIP.AUTO: 1.01 (ref 1–1.03)
SP GR UR STRIP.AUTO: 1.01 (ref 1–1.03)
T AXIS: 182 DEGREES
T AXIS: 37 DEGREES
TOTAL CELLS COUNTED: 100
TOTAL CELLS COUNTED: 100
TOTAL HEMOGLOBIN: 11.3 G/DL (ref 11.7–16)
TROPONIN I SERPL-MCNC: <0.046 NG/ML (ref ?–0.05)
UROBILINOGEN UR STRIP.AUTO-MCNC: <2 MG/DL
UROBILINOGEN UR STRIP.AUTO-MCNC: <2 MG/DL
VENTRICULAR RATE: 104 BPM
VENTRICULAR RATE: 77 BPM
WBC # BLD AUTO: 17 X10(3) UL (ref 4–13)
WBC # BLD AUTO: 21.6 X10(3) UL (ref 4–13)
WBC # BLD AUTO: 22.1 X10(3) UL (ref 4–13)
WBC # BLD AUTO: 27.5 X10(3) UL (ref 4–13)
WBC # BLD AUTO: 35.4 X10(3) UL (ref 4–13)
WBC #/AREA URNS AUTO: >50 /HPF
WBC CLUMPS UR QL AUTO: PRESENT
WBC CLUMPS UR QL AUTO: PRESENT

## 2018-01-01 PROCEDURE — 70450 CT HEAD/BRAIN W/O DYE: CPT | Performed by: EMERGENCY MEDICINE

## 2018-01-01 PROCEDURE — 71045 X-RAY EXAM CHEST 1 VIEW: CPT | Performed by: EMERGENCY MEDICINE

## 2018-01-01 PROCEDURE — 74176 CT ABD & PELVIS W/O CONTRAST: CPT | Performed by: EMERGENCY MEDICINE

## 2018-01-01 PROCEDURE — 99223 1ST HOSP IP/OBS HIGH 75: CPT | Performed by: HOSPITALIST

## 2018-01-01 PROCEDURE — 99232 SBSQ HOSP IP/OBS MODERATE 35: CPT | Performed by: HOSPITALIST

## 2018-01-01 PROCEDURE — 99356 PROLONGED SERV,INPATIENT,1ST HR: CPT | Performed by: CLINICAL NURSE SPECIALIST

## 2018-01-01 PROCEDURE — 99232 SBSQ HOSP IP/OBS MODERATE 35: CPT | Performed by: INTERNAL MEDICINE

## 2018-01-01 PROCEDURE — 99221 1ST HOSP IP/OBS SF/LOW 40: CPT | Performed by: CLINICAL NURSE SPECIALIST

## 2018-01-01 PROCEDURE — 99233 SBSQ HOSP IP/OBS HIGH 50: CPT | Performed by: CLINICAL NURSE SPECIALIST

## 2018-01-01 PROCEDURE — 99232 SBSQ HOSP IP/OBS MODERATE 35: CPT | Performed by: CLINICAL NURSE SPECIALIST

## 2018-01-01 PROCEDURE — 99221 1ST HOSP IP/OBS SF/LOW 40: CPT | Performed by: NURSE PRACTITIONER

## 2018-01-01 PROCEDURE — 99497 ADVNCD CARE PLAN 30 MIN: CPT | Performed by: NURSE PRACTITIONER

## 2018-01-01 PROCEDURE — 99231 SBSQ HOSP IP/OBS SF/LOW 25: CPT | Performed by: HOSPITALIST

## 2018-01-01 PROCEDURE — 99223 1ST HOSP IP/OBS HIGH 75: CPT | Performed by: COLON & RECTAL SURGERY

## 2018-01-01 PROCEDURE — 99291 CRITICAL CARE FIRST HOUR: CPT | Performed by: NURSE PRACTITIONER

## 2018-01-01 PROCEDURE — 99231 SBSQ HOSP IP/OBS SF/LOW 25: CPT | Performed by: NURSE PRACTITIONER

## 2018-01-01 PROCEDURE — 71045 X-RAY EXAM CHEST 1 VIEW: CPT | Performed by: NURSE PRACTITIONER

## 2018-01-01 PROCEDURE — 99239 HOSP IP/OBS DSCHRG MGMT >30: CPT | Performed by: HOSPITALIST

## 2018-01-01 RX ORDER — LORAZEPAM 2 MG/ML
0.5 INJECTION INTRAMUSCULAR EVERY 4 HOURS PRN
Status: DISCONTINUED | OUTPATIENT
Start: 2018-01-01 | End: 2018-01-01

## 2018-01-01 RX ORDER — ENOXAPARIN SODIUM 100 MG/ML
30 INJECTION SUBCUTANEOUS NIGHTLY
Status: DISCONTINUED | OUTPATIENT
Start: 2018-01-01 | End: 2018-01-01

## 2018-01-01 RX ORDER — DEXTROSE AND SODIUM CHLORIDE 5; .9 G/100ML; G/100ML
INJECTION, SOLUTION INTRAVENOUS CONTINUOUS
Status: DISCONTINUED | OUTPATIENT
Start: 2018-01-01 | End: 2018-01-01

## 2018-01-01 RX ORDER — LORAZEPAM 2 MG/ML
1 INJECTION INTRAMUSCULAR EVERY 4 HOURS PRN
Status: DISCONTINUED | OUTPATIENT
Start: 2018-01-01 | End: 2018-01-01

## 2018-01-01 RX ORDER — MORPHINE SULFATE 4 MG/ML
2 INJECTION, SOLUTION INTRAMUSCULAR; INTRAVENOUS EVERY 2 HOUR PRN
Status: DISCONTINUED | OUTPATIENT
Start: 2018-01-01 | End: 2018-01-01

## 2018-01-01 RX ORDER — SODIUM CHLORIDE 9 MG/ML
125 INJECTION, SOLUTION INTRAVENOUS CONTINUOUS
Status: DISCONTINUED | OUTPATIENT
Start: 2018-01-01 | End: 2018-01-01

## 2018-01-01 RX ORDER — ALBUMIN (HUMAN) 12.5 G/50ML
50 SOLUTION INTRAVENOUS ONCE
Status: COMPLETED | OUTPATIENT
Start: 2018-01-01 | End: 2018-01-01

## 2018-01-01 RX ORDER — POTASSIUM CHLORIDE 14.9 MG/ML
20 INJECTION INTRAVENOUS ONCE
Status: COMPLETED | OUTPATIENT
Start: 2018-01-01 | End: 2018-01-01

## 2018-01-01 RX ORDER — SODIUM CHLORIDE 9 MG/ML
INJECTION, SOLUTION INTRAVENOUS CONTINUOUS
Status: DISCONTINUED | OUTPATIENT
Start: 2018-01-01 | End: 2018-01-01

## 2018-01-01 RX ORDER — ACETAMINOPHEN 325 MG/1
650 TABLET ORAL EVERY 6 HOURS PRN
Status: DISCONTINUED | OUTPATIENT
Start: 2018-01-01 | End: 2018-01-01

## 2018-01-01 RX ORDER — LORAZEPAM 2 MG/ML
2 INJECTION INTRAMUSCULAR EVERY 4 HOURS PRN
Status: DISCONTINUED | OUTPATIENT
Start: 2018-01-01 | End: 2018-01-01

## 2018-01-01 RX ORDER — GLYCOPYRROLATE 0.2 MG/ML
0.2 INJECTION, SOLUTION INTRAMUSCULAR; INTRAVENOUS
Status: DISCONTINUED | OUTPATIENT
Start: 2018-01-01 | End: 2018-01-01

## 2018-01-01 RX ORDER — MORPHINE SULFATE 4 MG/ML
2 INJECTION, SOLUTION INTRAMUSCULAR; INTRAVENOUS EVERY 2 HOUR PRN
Status: CANCELLED | OUTPATIENT
Start: 2018-01-01

## 2018-01-01 RX ORDER — DEXTROSE MONOHYDRATE 25 G/50ML
50 INJECTION, SOLUTION INTRAVENOUS
Status: DISCONTINUED | OUTPATIENT
Start: 2018-01-01 | End: 2018-01-01

## 2018-01-01 RX ORDER — LORAZEPAM 2 MG/ML
1 INJECTION INTRAMUSCULAR EVERY 4 HOURS PRN
Status: CANCELLED | OUTPATIENT
Start: 2018-01-01

## 2018-01-01 RX ORDER — METOCLOPRAMIDE HYDROCHLORIDE 5 MG/ML
5 INJECTION INTRAMUSCULAR; INTRAVENOUS EVERY 8 HOURS PRN
Status: DISCONTINUED | OUTPATIENT
Start: 2018-01-01 | End: 2018-01-01

## 2018-01-01 RX ORDER — CEPHALEXIN 500 MG/1
500 CAPSULE ORAL EVERY 8 HOURS SCHEDULED
Status: DISCONTINUED | OUTPATIENT
Start: 2018-01-01 | End: 2018-01-01

## 2018-01-01 RX ORDER — ONDANSETRON 2 MG/ML
4 INJECTION INTRAMUSCULAR; INTRAVENOUS EVERY 6 HOURS PRN
Status: DISCONTINUED | OUTPATIENT
Start: 2018-01-01 | End: 2018-01-01

## 2018-01-01 RX ORDER — MORPHINE SULFATE 4 MG/ML
1 INJECTION, SOLUTION INTRAMUSCULAR; INTRAVENOUS EVERY 2 HOUR PRN
Status: DISCONTINUED | OUTPATIENT
Start: 2018-01-01 | End: 2018-01-01

## 2018-01-01 RX ORDER — ACETAMINOPHEN 650 MG/1
650 SUPPOSITORY RECTAL EVERY 6 HOURS PRN
Status: DISCONTINUED | OUTPATIENT
Start: 2018-01-01 | End: 2018-01-01

## 2018-01-01 RX ORDER — ONDANSETRON 2 MG/ML
4 INJECTION INTRAMUSCULAR; INTRAVENOUS EVERY 4 HOURS PRN
Status: DISCONTINUED | OUTPATIENT
Start: 2018-01-01 | End: 2018-01-01

## 2018-01-01 RX ORDER — ONDANSETRON 2 MG/ML
4 INJECTION INTRAMUSCULAR; INTRAVENOUS EVERY 6 HOURS PRN
Status: CANCELLED | OUTPATIENT
Start: 2018-01-01

## 2018-01-01 RX ORDER — LORAZEPAM 2 MG/ML
2 INJECTION INTRAMUSCULAR EVERY 4 HOURS PRN
Status: CANCELLED | OUTPATIENT
Start: 2018-01-01

## 2018-01-01 RX ORDER — CEPHALEXIN 500 MG/1
500 CAPSULE ORAL EVERY 12 HOURS SCHEDULED
Status: DISCONTINUED | OUTPATIENT
Start: 2018-01-01 | End: 2018-01-01 | Stop reason: DRUGHIGH

## 2018-01-01 RX ORDER — METOCLOPRAMIDE HYDROCHLORIDE 5 MG/ML
10 INJECTION INTRAMUSCULAR; INTRAVENOUS EVERY 8 HOURS PRN
Status: DISCONTINUED | OUTPATIENT
Start: 2018-01-01 | End: 2018-01-01

## 2018-01-01 RX ORDER — LORAZEPAM 2 MG/ML
0.5 INJECTION INTRAMUSCULAR EVERY 4 HOURS PRN
Status: CANCELLED | OUTPATIENT
Start: 2018-01-01

## 2018-01-01 RX ORDER — MORPHINE SULFATE 4 MG/ML
1 INJECTION, SOLUTION INTRAMUSCULAR; INTRAVENOUS EVERY 2 HOUR PRN
Status: CANCELLED | OUTPATIENT
Start: 2018-01-01

## 2018-01-01 RX ORDER — ALBUMIN (HUMAN) 12.5 G/50ML
SOLUTION INTRAVENOUS
Status: COMPLETED
Start: 2018-01-01 | End: 2018-01-01

## 2018-01-01 RX ORDER — MORPHINE SULFATE 4 MG/ML
1-2 INJECTION, SOLUTION INTRAMUSCULAR; INTRAVENOUS EVERY 2 HOUR PRN
Status: DISCONTINUED | OUTPATIENT
Start: 2018-01-01 | End: 2018-01-01

## 2018-01-01 RX ORDER — GLYCOPYRROLATE 0.2 MG/ML
0.2 INJECTION, SOLUTION INTRAMUSCULAR; INTRAVENOUS
Status: CANCELLED | OUTPATIENT
Start: 2018-01-01

## 2018-01-01 RX ORDER — SODIUM CHLORIDE, SODIUM LACTATE, POTASSIUM CHLORIDE, CALCIUM CHLORIDE 600; 310; 30; 20 MG/100ML; MG/100ML; MG/100ML; MG/100ML
INJECTION, SOLUTION INTRAVENOUS CONTINUOUS
Status: DISCONTINUED | OUTPATIENT
Start: 2018-01-01 | End: 2018-01-01

## 2018-01-01 RX ORDER — HYDRALAZINE HYDROCHLORIDE 20 MG/ML
10 INJECTION INTRAMUSCULAR; INTRAVENOUS EVERY 6 HOURS PRN
Status: DISCONTINUED | OUTPATIENT
Start: 2018-01-01 | End: 2018-01-01

## 2018-01-01 RX ORDER — ACETAMINOPHEN 650 MG/1
650 SUPPOSITORY RECTAL EVERY 6 HOURS PRN
Status: CANCELLED | OUTPATIENT
Start: 2018-01-01

## 2018-01-01 RX ORDER — MAGNESIUM SULFATE HEPTAHYDRATE 40 MG/ML
2 INJECTION, SOLUTION INTRAVENOUS ONCE
Status: COMPLETED | OUTPATIENT
Start: 2018-01-01 | End: 2018-01-01

## 2018-01-01 RX ORDER — ENOXAPARIN SODIUM 100 MG/ML
40 INJECTION SUBCUTANEOUS NIGHTLY
Status: DISCONTINUED | OUTPATIENT
Start: 2018-01-01 | End: 2018-01-01

## 2018-01-01 RX ORDER — LEVETIRACETAM 500 MG/1
500 TABLET ORAL 2 TIMES DAILY
Status: DISCONTINUED | OUTPATIENT
Start: 2018-01-01 | End: 2018-01-01

## 2018-01-01 RX ORDER — SODIUM CHLORIDE 9 MG/ML
INJECTION, SOLUTION INTRAVENOUS ONCE
Status: COMPLETED | OUTPATIENT
Start: 2018-01-01 | End: 2018-01-01

## 2018-07-28 PROBLEM — T83.511A URINARY TRACT INFECTION ASSOCIATED WITH INDWELLING URETHRAL CATHETER (HCC): Status: ACTIVE | Noted: 2018-01-01

## 2018-07-28 PROBLEM — T83.511A URINARY TRACT INFECTION ASSOCIATED WITH INDWELLING URETHRAL CATHETER, INITIAL ENCOUNTER (HCC): Status: ACTIVE | Noted: 2018-01-01

## 2018-07-28 PROBLEM — N39.0 URINARY TRACT INFECTION ASSOCIATED WITH INDWELLING URETHRAL CATHETER, INITIAL ENCOUNTER (HCC): Status: ACTIVE | Noted: 2018-01-01

## 2018-07-28 PROBLEM — N39.0 URINARY TRACT INFECTION ASSOCIATED WITH INDWELLING URETHRAL CATHETER (HCC): Status: ACTIVE | Noted: 2018-01-01

## 2018-07-28 NOTE — ED INITIAL ASSESSMENT (HPI)
Patient here via EMS with c/o altered mental status, lethargy, decreased PO intake and dark urine output X 2-3 days. Patient does have history of dementia, usually alert and oriented X 2-3. EMS found patient to be 89% on RA, does not wear home 02.

## 2018-07-28 NOTE — ED PROVIDER NOTES
Patient Seen in: [facility] Emergency Department    History   Patient presents with:  Altered Mental Status (neurologic)  Dehydration (metabolic/constitutional)    Stated Complaint: altered, probable UTI, dehyrdation     HPI    55-year-old female wa °F (36.9 °C) (Temporal)   Resp 17   Wt 64 kg   SpO2 100%   BMI 27.56 kg/m²         Physical Exam    HEENT : NCAT, right eye with some deviation noted laterally , PEERL, throat clear, neck supple, no JVD, trachea midline, No LAD  Heart: S1S2 normal.  Systol components within normal limits   POCT GLUCOSE - Abnormal; Notable for the following:     POC Glucose 153 (*)     All other components within normal limits   CBC W/ DIFFERENTIAL - Abnormal; Notable for the following:     WBC 22.1 (*)     RBC 3.75 (*)     H anterior temporal cortical atrophy. There is diffuse ventriculomegaly which may be secondary to ex vacuo phenomenon or NPH.   There is periventricular decreased white matter attenuation which may be edema associated with NPH or diffuse chronic ischemic tim disease with interstitial scarring and bronchial wall thickening. Mild degenerative changes of the spine. CONCLUSION:  No acute cardiopulmonary process.      Dictated by: Yadira Holguin MD on 7/28/2018 at 15:21     Approved by: Yadira Holguin MD

## 2018-07-29 NOTE — PLAN OF CARE
GASTROINTESTINAL - ADULT    • Maintains adequate nutritional intake (undernourished) Not Progressing          GENITOURINARY - ADULT    • Absence of urinary retention Progressing        METABOLIC/FLUID AND ELECTROLYTES - ADULT    • Glucose maintained within

## 2018-07-29 NOTE — PROGRESS NOTES
[facility] Pharmacy Note: Renal dose adjustment for Enoxaparin (Lovenox)  Joyce Givens has been prescribed Enoxaparin (Lovenox)  40 mg subcutaneously every 24 hours. Estimated Creatinine Clearance: 27.1 mL/min (A) (based on SCr of 1.15 mg/dL (H)).     He

## 2018-07-29 NOTE — PLAN OF CARE
GASTROINTESTINAL - ADULT    • Maintains adequate nutritional intake (undernourished) Not Progressing          GASTROINTESTINAL - ADULT    • Maintains adequate nutritional intake (undernourished) Not Progressing          METABOLIC/FLUID AND ELECTROLYTES - A

## 2018-07-29 NOTE — PROGRESS NOTES
[facility] Pharmacy Note:  Renal Dose Adjustment for Enoxaparin (LOVENOX)    Deyanira Cooper has been prescribed Enoxaparin (LOVENOX) 30 mg subcutaneously every 24 hours. Estimated Creatinine Clearance: 38.9 mL/min (based on SCr of 0.8 mg/dL). - improved.

## 2018-07-29 NOTE — H&P
[facility]  History and Physical     Burnadette Siemens Patient Status:  Emergency    1935 MRN [id number]   Location [facility] Attending Graciela Christopher MD   Hosp Day # 0 PCP Bria Garcia MD     Chief Compla 24 Hr Take 180 mg by mouth daily. Disp:  Rfl:    tamsulosin HCl 0.4 MG Oral Cap Take by mouth daily. Disp:  Rfl:    Atorvastatin Calcium (LIPITOR) 20 MG Oral Tab Take 20 mg by mouth daily.  Disp:  Rfl:    MetFORMIN HCl (GLUCOPHAGE) 500 MG Oral Tab Take 500 Estimated Creatinine Clearance: 27.1 mL/min (A) (based on SCr of 1.15 mg/dL (H)). Recent Labs   Lab  07/28/18   1613   PTP  15.0*   INR  1.13*       Recent Labs   Lab  07/28/18 1613   TROP  <0.046       Imaging: Imaging data reviewed in Epic.

## 2018-07-29 NOTE — ED NOTES
[facility] Pt is resting on cart with IV fluids infusing. No signs of distress noted.

## 2018-07-29 NOTE — PROGRESS NOTES
Pharmacy Note: Renal dose adjustment for Metoclopramide (Reglan)  Bekah Chisholm has been prescribed Metoclopramide (Reglan) 10 mg every 8 hours as needed. Estimated Creatinine Clearance: 27.1 mL/min (A) (based on SCr of 1.15 mg/dL (H)).     Her calc

## 2018-07-29 NOTE — PROGRESS NOTES
[facility]  Progress Note     Pillo Mannysona Patient Status:  Inpatient    1935 MRN [id number]   [facility] 4NW-A Attending Siddhartha Robledo MD   Hosp Day # 1 PCP Nancy Perera MD     Chief Complaint: AMS    S: Patient le ASSESSMENT / PLAN:     1. Acute encephalopathy: Secondary to UTI possibly however has chronic indwelling Mcclure catheter. Continue with antibiotics and IV fluids at this time.   Keep n.p.o. for now, swallow eval in a.m.  2. Diabetes mellitus type 2: I

## 2018-07-29 NOTE — SLP NOTE
Orders received for swallow evaluation by Dr. Urszula Nguyen with attending MD Dr. Lisa Trevizo, who requested to hold evaluation due to patient lethargy at this time. Will see patient tomorrow 7/30/18 and proceed with evaluation if appropriate.

## 2018-07-30 NOTE — CM/SW NOTE
07/30/18 1500   CM/SW Screening   Referral Source    Information Source Chart review;Nursing rounds   Patient's Mental Status Alert   Patient lives with Children   Patient Status Prior to Admission   Independent with ADLs and Mobility No   P

## 2018-07-30 NOTE — SLP NOTE
ADULT SWALLOWING EVALUATION    ASSESSMENT    ASSESSMENT/OVERALL IMPRESSION:  Pt seen this AM for bedside swallow evaluation. Pt admitted to hospital due to AMS and lethargy. Pt diagnosed with UTI.  At baseline pt with significant dementia- confused and nonv MEDICAL HISTORY  Reason for Referral: R/O aspiration    Problem List  Principal Problem:    Urinary tract infection associated with indwelling urethral catheter, initial encounter ([facility] [address]  Active Problems:    Altered mental status, unspecified altered ment Functional Limits  Strength: Unable to assess  Tone: Unable to assess  Range of Motion: Unable to assess  Rate of Motion: Unable to assess    Voice Quality:  (Pt did not vocalize )  Respiratory Status: Supplemental O2;Nasal cannula  Consistencies Trialed:

## 2018-07-30 NOTE — PHYSICAL THERAPY NOTE
PHYSICAL THERAPY QUICK EVALUATION - INPATIENT    Room Number: 424/424-A  Evaluation Date: 7/30/2018  Presenting Problem: UTI       Pt was admitted from home on 7/28/2018 with UTI. Pt has a past medical history significant for dementia.  See below for deta INPATIENT SHORT FORM - BASIC MOBILITY  How much difficulty does the patient currently have. ..  -   Turning over in bed (including adjusting bedclothes, sheets and blankets)?: A Lot   -   Sitting down on and standing up from a chair with arms (e.g., wheelch moderate. PT Discharge Recommendations: 24 hour care/supervision    PLAN  Patient has been evaluated and presents with no skilled Physical Therapy needs at this time. Patient discharged from Physical Therapy services.   Please re-order if a new function

## 2018-07-30 NOTE — PROGRESS NOTES
Per family patient more alert. Remains NPO for swallow study. Turned and repositioned, johnson patent. Iv fluids continued.

## 2018-07-30 NOTE — CONSULTS
[facility] Pharmacy Note:  Renal Adjustment for Cephalexin     Burnadette Siemens is a 80year old female who has been prescribed Cephalexin   500 mg every 12 hrs. CrCl is estimated creatinine clearance is 40.5 mL/min (based on SCr of 0.77 mg/dL).  so the dose ha

## 2018-07-30 NOTE — CONSULTS
[facility]  [id number]  Hospital Day #2  Date of Consult: 07/30/18       Reason for Consultation: Consult requested for evaluation of palliative care needs and goals of care discussion. care discussions    Thank you for allowing the Palliative Care Team to participate in the care of your patient. We will continue to follow.     RAMAN Zamarripa  Palliative Care Nurse Practitioner  Pager [phone], Phone [phone]  7/30/2018  1:34 PM

## 2018-07-30 NOTE — PLAN OF CARE
Impaired Activities of Daily Living    • Achieve highest/safest level of independence in self care Adequate for Discharge        Impaired Functional Mobility    • Achieve highest/safest level of mobility/gait Adequate for Discharge          GASTROINTESTINA

## 2018-07-30 NOTE — DIETARY MALNUTRITION NOTE
[facility]    NUTRITION INITIAL ASSESSMENT    Pt meets severe malnutrition criteria.     CRITERIA FOR MALNUTRITION DIAGNOSIS:  Criteria for severe malnutrition diagnosis: chronic illness related to wt loss greater than 10% in 6 months, energy intake le dementia. Palliative care meeting to be set up to discuss POC per chart.    80year old female with past medical history of hypertension, diabetes mellitus type 2, hyperlipidemia, coronary artery disease, dementia presents to the emergency department altere

## 2018-07-30 NOTE — PROGRESS NOTES
[facility]  Progress Note     Missy Hoover Patient Status:  Inpatient    1935 MRN [id number]   [facility] 4NW-A Attending Kun Wells MD   Hosp Day # 2 PCP Jonathan Boateng MD     Chief Complaint: AMS    S: Patient mo • enoxaparin  40 mg Subcutaneous Nightly   • Insulin Aspart Pen  1-10 Units Subcutaneous TID AC and HS   • cefTRIAXone  1 g Intravenous Q24H   • levETIRAcetam  500 mg Intravenous Q12H   • CloNIDine HCl  1 patch Transdermal Once per day on Sun Fri       A

## 2018-07-30 NOTE — OCCUPATIONAL THERAPY NOTE
OCCUPATIONAL THERAPY QUICK EVALUATION - INPATIENT    Room Number: 424/424-A  Evaluation Date: 7/30/2018     Type of Evaluation: Quick Eval  Presenting Problem: UTI, encephalopathy, AMS     Physician Order: IP Consult to Occupational Therapy  Reason for The Performs stand-pivot transfer to w/c with assist. Has caregivers 2x/day- one comes in the mornings and assists Pt OOB and with bath, then daughter provides care from 1p-5p, and then 2nd caregiver comes from 5:30p-10:30p.  Receives HHPT and HHOT 2x/week each assistance  Sit to Stand: Dependent assistance    Skilled Therapy Provided: Pt was found in bed in a semi-supine position. Pt performed supine>sit EOB with total assistance. Pt performed bed>chair T/F via squat pivot with total A.  Pt was educated on safety lower extremities: unable to perform before admission  Patient/Caregiver able to demonstrate safety with ADLS: at previous functional level

## 2018-07-30 NOTE — PAYOR COMM NOTE
--------------  ADMISSION REVIEW     Payor: MEDICARE Keith Buckley  Subscriber #:  [id number]  Authorization Number: N/A    Admit date: 7/28/18  Admit time: 2100       Admitting Physician: Salvatore Reilly MD  Attending Physician:  Sophia Rosario MD  Primary for the following:     Clarity Urine Cloudy (*)     Blood Urine Small (*)     Leukocyte Esterase Urine Large (*)     WBC Urine 21-50 (*)     RBC URINE 6-10 (*)     Bacteria Urine 3+ (*)     Squamous Epi.  Cells Large (*)     Mucous Urine 1+ (*)     WBC Clum and anterior temporal regions with global cortical atrophy otherwise noted. Ventriculomegaly associated with diffuse bilateral periventricular and deep white matter diminished attenuation. 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   Luzmaria Munson RN      hydrALAzine HCl (APRESOLINE) injection 10 mg     Date Action Dose Route User    7/30/2018 0540 Given 10 mg Intravenous Cherrie Cummings, RN      Insulin Aspart Pen (NOVOLOG) 100 UNIT/ML flexpen 1-10 Units     Date Action Dose Route User    7/30/2

## 2018-07-31 NOTE — PLAN OF CARE
Caregiver at bedside. Stated patient refusing to swallow food put in her mouth. She used swabs to remove. PCT noted pocketed liquids in mouth-too lethargic to swallow. Suctioned with yanquer Family enc not to to give food or liquids while so lethargic.  They

## 2018-07-31 NOTE — CM/SW NOTE
07/31/18 1400   Discharge disposition   Expected discharge disposition Home or Self   Additional Home Care/Hospice Provider ([facility])   Discharge transportation [facility]

## 2018-07-31 NOTE — PROGRESS NOTES
[facility]  Progress Note     Azra Noriega Patient Status:  Inpatient    1935 MRN [id number]   [facility] 4NW-A Attending Vimal Patiño MD   Hosp Day # 3 PCP Wilber Banks MD     Chief Complaint: AMS    S: lethargic Imaging data reviewed in Epic.     Medications:   • levETIRAcetam  500 mg Intravenous Q12H   • cefTRIAXone  1 g Intravenous Q24H   • enoxaparin  40 mg Subcutaneous Nightly   • Insulin Aspart Pen  1-10 Units Subcutaneous TID AC and HS   • CloNIDine HCl  1 pa

## 2018-07-31 NOTE — SLP NOTE
Attempted to see pt for speech therapy services. Pt now NPO per RN report given bolus holding during medication administration requiring suctioning and assistance to clear bolus. Pt not appropriate for reevaluation given lethargy. RN aware.  Will continue t

## 2018-07-31 NOTE — CM/SW NOTE
CM informed that patient is discharging today. Called dtphu Martinez to inform her of discharge and discuss mode of transport. [facility] arranged and requested a 4PM  time. PCS form on chart.     MAE Ratliff RN, CTL/  P: [phone]

## 2018-07-31 NOTE — PLAN OF CARE
NURSING DISCHARGE NOTE    Discharged Home via Ambulance. Accompanied by Support staff  Belongings Taken by patient/family.

## 2018-07-31 NOTE — DISCHARGE SUMMARY
[facility]  DISCHARGE SUMMARY     Raphael Rosas Patient Status:  Inpatient    1935 MRN [id number]   [facility] 4NW-A Attending Shannon Morales MD   Hosp Day # 3 PCP Atif Montemayor MD     Date of Admission: 2018  Juan J could not take many of her medications and per her caregiver she does not take them regularly at home as she is unable to stay away to take them.     She was discharged home at her baseline status with home palliative care    Procedures during hospitalizati

## 2018-07-31 NOTE — PALLIATIVE CARE NOTE
1808 Miguel Young Follow Up      Clark Cueva  [id number]       Patient seen and evaluated, family at bedside.      ROS: unable to assess    Physical Exam:  GEN:  NAD  Neuro: lethargic  Respiratory:  Respirations even and unlabo

## 2018-09-04 PROBLEM — E87.3 RESPIRATORY ALKALOSIS: Status: ACTIVE | Noted: 2018-01-01

## 2018-09-04 PROBLEM — G93.41 METABOLIC ENCEPHALOPATHY: Status: ACTIVE | Noted: 2018-01-01

## 2018-09-04 PROBLEM — B99.9 ENCEPHALOPATHY DUE TO INFECTION: Status: ACTIVE | Noted: 2018-01-01

## 2018-09-04 PROBLEM — G93.1 ANOXIC ENCEPHALOPATHY (HCC): Status: ACTIVE | Noted: 2018-01-01

## 2018-09-04 PROBLEM — R10.9 ABDOMINAL PAIN OF UNKNOWN ETIOLOGY: Status: ACTIVE | Noted: 2018-01-01

## 2018-09-04 PROBLEM — G31.2 ALCOHOLIC ENCEPHALOPATHY (HCC): Status: ACTIVE | Noted: 2018-01-01

## 2018-09-04 PROBLEM — G92.9 ENCEPHALOPATHY, TOXIC: Status: ACTIVE | Noted: 2018-01-01

## 2018-09-04 PROBLEM — K63.1 PERFORATION BOWEL (HCC): Status: ACTIVE | Noted: 2018-01-01

## 2018-09-04 PROBLEM — D72.829 LEUKOCYTOSIS: Status: ACTIVE | Noted: 2018-01-01

## 2018-09-04 PROBLEM — K72.90 ENCEPHALOPATHY, HEPATIC (HCC): Status: ACTIVE | Noted: 2018-01-01

## 2018-09-04 PROBLEM — I67.89 ISCHEMIC ENCEPHALOPATHY: Status: ACTIVE | Noted: 2018-01-01

## 2018-09-04 PROBLEM — A41.89 SEPSIS DUE TO OTHER ETIOLOGY (HCC): Status: ACTIVE | Noted: 2018-01-01

## 2018-09-04 PROBLEM — F10.20 ALCOHOLIC ENCEPHALOPATHY (HCC): Status: ACTIVE | Noted: 2018-01-01

## 2018-09-04 PROBLEM — G93.40 ACUTE ENCEPHALOPATHY: Status: ACTIVE | Noted: 2018-01-01

## 2018-09-04 PROBLEM — F07.81 TRAUMATIC ENCEPHALOPATHY: Status: ACTIVE | Noted: 2018-01-01

## 2018-09-04 PROBLEM — I67.4 HYPERTENSIVE ENCEPHALOPATHY: Status: ACTIVE | Noted: 2018-01-01

## 2018-09-04 PROBLEM — G93.49 ENCEPHALOPATHY DUE TO INFECTION: Status: ACTIVE | Noted: 2018-01-01

## 2018-09-05 PROBLEM — Z51.5 PALLIATIVE CARE ENCOUNTER: Status: ACTIVE | Noted: 2018-01-01

## 2018-09-05 NOTE — ED NOTES
Report given to Monica Newman, [facility]. Patient and family updated with plan of care, no questions at this time.

## 2018-09-05 NOTE — CONSULTS
[facility]  Report of Consultation    Julian  Patient Status:  Inpatient    1935 MRN [id number]   [facility] 4SW-A Attending Trevon Nino MD   Hosp Day # 1 PCP Tammy Lewis MD     Requesting Physician:  Maricruz Lomeli History:  No date: CHOLECYSTECTOMY  No date: FRACTURE SURGERY  No date: OTHER SURGICAL HISTORY      Comment: thigh and knee fx on left  No date: PACEMAKER  Family History   Problem Relation Age of Onset   • Cancer Sister      breast cancer      reports jason palpitations, No leg swelling  Gastrointestinal: No abdominal pain, No abdominal distention, No bloating, No nausea, No vomiting, No diarrhea, No constipation, No melena, No hematochezia  Endocrine: No polydipsia, No polyuria  Genitoruinary: No dysuria, No matter as well as probable chronic lacunar infarcts present. If there is concern for acute ischemia  then recommend MRI.     Dictated by: Keith Crump MD on 9/04/2018 at 21:40     Approved by: Keith Crump MD            Ct Abdomen+pelvis(cpt=74176)    Result D Closed fracture of unspecified part of lower end of femur     Contusion of left hand including fingers, initial encounter     Contusion of left hand including fingers, subsequent encounter     Left hand pain     Left wrist pain     Acute cystitis without h care for herself. -The surgical intervention for th intra-abdominal process would be a large laparotomy with abdominal washout. Partial versus subtotal colon resection, and colostomy versus ileostomy depending on extent of colon resection.   -Given the pa

## 2018-09-05 NOTE — ED PROVIDER NOTES
Patient Seen in: [facility] Emergency Department    History   Patient presents with:  Altered Mental Status (neurologic)    Stated Complaint: Unresponsive     HPI    This is a 40-year-old female who has a history of dementia, type 2 diabetes, hypert toxic    HEENT: Atraumatic, conjunctiva are not pale. There is no icterus. Oral mucosa Is wet. No facial trauma. The neck is supple. There is no meningismus    LUNGS: Clear to auscultation, there is no wheezing or retraction. No crackles.     CV: Card were created for panel order CBC WITH DIFFERENTIAL WITH PLATELET.   Procedure                               Abnormality         Status                     ---------                               -----------         ------                     CBC W/ DIFFERCUONG that there that long-term she may have significant mortality and morbidity with going to surgery. And even if she does not go to surgery she may get worse.   I the feel at this present they are leaning toward not having surgery but will need to talk to the convincing evidence of acute intracranial hemorrhage. Visualized paranasal sinuses and mastoid airspaces appear clear. CONCLUSION:  No acute intracranial abnormality is seen.   Overall stable exam with ventriculo megaly and areas of hypoattenuation pr perforated diverticulitis. Less likely would be appendicitis. There is infiltration of the greater omentum in the right lower quadrant as well. A bowel obstruction is not identified.   The stomach and  duodenum are grossly normal.  There has been a edwin (CPT=71045)  TECHNIQUE:  AP chest radiograph was obtained. COMPARISON:  [facility] , XR CHEST AP PORTABLE  (CPT=71045), 7/28/2018, 15:10.   INDICATIONS:  Unresponsive  PATIENT STATED HISTORY: (As transcribed by Technologist)  Patient offered no additional hist

## 2018-09-05 NOTE — CONSULTS
[facility]  Report of Consultation    Bekah Chisholm Patient Status:  Inpatient    1935 MRN [id number]   [facility] 4SW-A Attending Carlitos Arevalo MD   Hosp Day # 1 PCP Hector Sandoval MD     Reason for Consultation: Hurley Apley CHOLECYSTECTOMY  No date: FRACTURE SURGERY  No date: OTHER SURGICAL HISTORY      Comment: thigh and knee fx on left  No date: PACEMAKER  Family History   Problem Relation Age of Onset   • Cancer Sister      breast cancer      reports that she has never smo trachea midline, no adenopathy, no thyromegaly. Lungs: Respirations are shallow and she does not inspire deeply on command. No wheezes are noted and she does not appear to be in respiratory distress   Chest wall: No tenderness or deformity.    Heart: Reg is marked sigmoid diverticulosis. The appendix is not visualized. · Status post cholecystectomy. · Atrophic pancreas. · Moderate fluid within the gastric fundus. · Left hip screw and left femoral intra medullary mae. Assessment and Plan:    1.  Pi

## 2018-09-05 NOTE — PLAN OF CARE
Delirium    • Minimize duration of delirium Not Progressing        NEUROLOGICAL - ADULT    • Achieves stable or improved neurological status Not Progressing          CARDIOVASCULAR - ADULT    • Maintains optimal cardiac output and hemodynamic stability Pro Dry/Warm

## 2018-09-05 NOTE — PROGRESS NOTES
[facility]  Progress Note    Julian  Patient Status:  Inpatient    1935 MRN [id number]   [facility] 4SW-A Attending Trevon Nino MD   Hosp Day # 1 PCP Tammy Lewis MD     Subjective:  Patient sleeping, rouses to v List:     Dementia     Type II or unspecified type diabetes mellitus without mention of complication, not stated as uncontrolled     Essential hypertension     Mixed hyperlipidemia     Neuropathy     Pacemaker     Unspecified vitamin D deficiency     Urina teams  2. Patient's family reaffirms desire for no surgical intervention at this time  3. Further decision making to take place once more family arrives  4. For now, continued resuscitation efforts with fluids/antibiotics are reasonable  5.  NPO    My total

## 2018-09-05 NOTE — CONSULTS
[facility]  [id number]  Hospital Day #1  Date of Consult: 09/05/18       Reason for Consultation: Consult requested for evaluation of palliative care needs and goals of care discussion. make any decisions regarding end-of-life care until speaking with their sibling. Daughter did indicate she is in favor of comfort measures only. Son stated he is considering continuing IVF and other life sustaining measures for 4-5 more days.   Reviewed

## 2018-09-05 NOTE — H&P
[facility]  History and Physical     Enrrique Cosme Patient Status:  Inpatient    1935 MRN [id number]   [facility] 4SW-A Attending Beverly Barajas MD   Hosp Day # 0 PCP Jarrell Avina MD     Chief Complaint: AMS    Histo (two) times daily with meals. Disp:  Rfl:    Metoprolol Tartrate 25 MG Oral Tab Take 25 mg by mouth 2 (two) times daily. Disp:  Rfl:        Review of Systems:   A comprehensive 14 point review of systems was completed.     Pertinent positives and negative spectum abx per sepsis protocol. Blood cultures pending. 2. Diverticulitis with perforation- Will pt advanced age and advanced dementia family likely thinking no surgery. General surgery on consult. Continue zosyn  3. L:actic acidosis- Secondary to #1.  Timothy

## 2018-09-05 NOTE — PROGRESS NOTES
ICU  Critical Care APRN H & P    NAME: Lenore Kawasaki - ROOM: [facility] - MRN: [id number] - Age: 80year old - :    History Of Present Illness:  Lenore Kawasaki is a 80year old female with PMHx significant for CAD, dementia, dm2, hypertens General Appearance: not alert, cooperative, pain, appears stated age  Neck: No JVD, neck supple, no adenopathy, trachea midline, no carotid bruits  Lungs: Clear to auscultation bilaterally, respirations unlabored  Heart: Regular rate and rhythm, S1 and S2 CONCLUSION:  Large amount of free air. Right lower quadrant abscess formation below the cecum and lateral to the sigmoid most likely perforated diverticulitis less likely perforated appendicitis.   There is marked inflammation of the pericolonic fat and -hold meds given perforated bowel    3. HTN  -may require prn IV medications   -could be r/t pain, attempt at pain management control    4.  Dm2  -insulin ss coverage       F/E/N:  IVF, NPO     Proph:  -No a/c at this time d/t evidence of bleeding/thrombocy

## 2018-09-05 NOTE — ED INITIAL ASSESSMENT (HPI)
Patient started to have AMS four hours ago.  Family decided to call EMS when patient wouldn't open eyes per ems

## 2018-09-05 NOTE — PAYOR COMM NOTE
--------------  ADMISSION REVIEW     Payor: MEDICARE Simón Leach  Subscriber #:  [id number]  Authorization Number: N/A    Admit date: 9/4/18  Admit time: 2331       Admitting Physician: Jorge Tilley MD  Attending Physician:  Jorge Tilley MD  Primary Ca for the following:     PT 18.5 (*)     INR 1.48 (*)     All other components within normal limits   CBC W/ DIFFERENTIAL - Abnormal; Notable for the following:     WBC 35.4 (*)     HGB 11.8 (*)     MCH 26.9 (*)     RDW-SD 48.1 (*)     Neutrophil Absolute Pr likely perforated diverticulitis less likely perforated appendicitis. There is marked inflammation of the pericolonic fat and greater omentum in the right lower quadrant. The abscess itself measures 4.4 x 5.0 x 4.5 cm. There is gas and fluid within it. flexpen 1-10 Units     Date Action Dose Route User    9/5/2018 0030 Given 1 Units Subcutaneous (Right Upper Arm) Lary Bird RN      Insulin Aspart Pen (NOVOLOG) 100 UNIT/ML flexpen 2-10 Units     Date Action Dose Route User    9/5/2018 0843 Given 2 Units CT scan of the abdomen and pelvis was personally reviewed. I agree with radiologist interpretation. There is diffuse pneumoperitoneum. There is a abscess in the right lower quadrant.   There is also a large amount of stool in the rectum extending up to t FAX DAYS CERTIFIED AND NEXT REVIEW DATE

## 2018-09-05 NOTE — PROGRESS NOTES
[facility] Pharmacy Note:  Renal Adjustment for piperacillin/tazobactam (Fabián Torrez)    rCistopher Michelle is a 80year old female who has been prescribed piperacillin/tazobactam (ZOSYN) 3.375 gm every 8 hrs.   CrCl is estimated creatinine clearance is 15.3 mL/min (A)

## 2018-09-05 NOTE — PLAN OF CARE
Received patient from ED with known perforated bowel. Family refused surgery. Patient lethargic. Opens eyes after stimulation. See flowsheet for further assessment. Tolerating 2L NC.  SR, occasional V paced. Low BP -- bolus given w/ improvement.   NPO

## 2018-09-05 NOTE — PROGRESS NOTES
[facility] Pharmacy Note:  Renal Dose Adjustment for Metoclopramide (REGLAN)    Afsaneh Ruiz has been prescribed Metoclopramide (REGLAN) 10 mg every 8 hours as needed for nausea/vomiting.     Estimated Creatinine Clearance: 15.3 mL/min (A) (based on SCr of 2

## 2018-09-05 NOTE — PROGRESS NOTES
[facility]  Progress Note     Aftab Kang Patient Status:  Inpatient    1935 MRN [id number]   [facility] 4SW-A Attending Brett Gold MD   Hosp Day # 1 PCP Ruben Nissen, MD     Chief Complaint: Peritonitis    S: Alexey Motts ASSESSMENT / PLAN:     1. Acute Peritonitis  2. Perforated diverticulitis  3. MARYBEL  4. Metabolic Acidosis  5. Sepsis  6. Lactic Acidosis  7. Hyponatremia  8. DM2  9. Advanced Dementia  10.  Seizure disorder:  Continue Keppra    Plan of care: Poor progn

## 2018-09-06 NOTE — PROGRESS NOTES
[facility]  Progress Note    Sharrell Back Patient Status:  Inpatient    1935 MRN [id number]   [facility] 4SW-A Attending Sushil Angulo MD   Hosp Day # 2 PCP Stephy Gimenez MD     STATUS UPDATE: The patient did meet with p guarding, no   rebound, positive but very hypoactive BS   Extremity: No edema, no cyanosis   Neurological: Alert, interactive, no focal deficits    Lab Data Review:  Recent Labs   Lab  09/04/18   2043  09/05/18   0443  09/06/18   0644   RBC  4.38  3.76*  3

## 2018-09-06 NOTE — PLAN OF CARE
Delirium    • Minimize duration of delirium Not Progressing        NEUROLOGICAL - ADULT    • Achieves stable or improved neurological status Not Progressing          CARDIOVASCULAR - ADULT    • Maintains optimal cardiac output and hemodynamic stability Pro

## 2018-09-06 NOTE — CM/SW NOTE
Responding to [facility] order for [facility]. (Family goal is to get pt home on hospice if stabilized / possible). Comfort care being initiated. Sent [facility] referral to [facility].     Eufemia Malik, 09/06/18, 11:41 AM

## 2018-09-06 NOTE — PROGRESS NOTES
[facility]  Progress Note     Taylor Healy Patient Status:  Inpatient    1935 MRN [id number]   [facility] 4SW-A Attending  Fely Harrington MD   Hosp Day # 2 PCP Yareli Romano MD     Chief Complaint: Peritonitis    S: HXPBPG 6.3  5.4*  5.3*       Estimated Creatinine Clearance: 28.2 mL/min (A) (based on SCr of 1.25 mg/dL (H)). Recent Labs   Lab  09/04/18 2043   PTP  18.5*   INR  1.48*       No results for input(s): TROP, CK in the last 168 hours.          Imaging: Imaging

## 2018-09-06 NOTE — PALLIATIVE CARE NOTE
[address] Follow Up      Patricia Lopes  [id number]       Patient seen and evaluated, family at bedside.      ROS: unable to provide     Physical Exam:  GEN:  NAD, recently given morphine IVP  Neuro:  Eyes are open  Respir

## 2018-09-06 NOTE — HOSPICE RN NOTE
Hospice Nurse Liaison met with patient's daughter Devaughn Sylvester, son, grandson, and two other family members. Upon arrival into the room patient was resting comfortable in bed with no apparent pain/distress.   Daughter was feeding the patient tea at the bedside, offered to have  dispensed a referral sheet for provate caregivers, which they refused and stated they can find it themselves. Family requested that they see how the patient does over the next 24 hours before a decision of any kind is made.

## 2018-09-07 PROBLEM — K72.90 ENCEPHALOPATHY, HEPATIC (HCC): Status: RESOLVED | Noted: 2018-01-01 | Resolved: 2018-01-01

## 2018-09-07 PROBLEM — K66.8 PNEUMOPERITONEUM: Status: ACTIVE | Noted: 2018-01-01

## 2018-09-07 PROBLEM — F10.20 ALCOHOLIC ENCEPHALOPATHY (HCC): Status: RESOLVED | Noted: 2018-01-01 | Resolved: 2018-01-01

## 2018-09-07 PROBLEM — I67.4 HYPERTENSIVE ENCEPHALOPATHY: Status: RESOLVED | Noted: 2018-01-01 | Resolved: 2018-01-01

## 2018-09-07 PROBLEM — G93.1 ANOXIC ENCEPHALOPATHY (HCC): Status: RESOLVED | Noted: 2018-01-01 | Resolved: 2018-01-01

## 2018-09-07 PROBLEM — G31.2 ALCOHOLIC ENCEPHALOPATHY (HCC): Status: RESOLVED | Noted: 2018-01-01 | Resolved: 2018-01-01

## 2018-09-07 PROBLEM — K57.80 PERFORATED DIVERTICULUM: Status: ACTIVE | Noted: 2018-01-01

## 2018-09-07 NOTE — PLAN OF CARE
Patient is noted asleep,appears comfortable. No moaning,no labor breathin. No further requests from family who are at the bedside. Keppra dose this morning given. Seizure precautions observed. Kept comfortable. Will continue to monitor patient.

## 2018-09-07 NOTE — HOSPICE RN NOTE
Present with Palliative APN for follow up with family for hospice services. Patient noted to be dozing and opened eyes to look around but otherwise appeared comfortable and without distress, respiratory staus unlabored; not verbalizing at present.   She is

## 2018-09-07 NOTE — PROGRESS NOTES
[facility]  Progress Note     Jose Becker Patient Status:  Inpatient    1935 MRN [id number]   [facility] 4SW-A Attending  José Antonio Burroughs MD   Hosp Day # 3 PCP Juan Salgado MD     Chief Complaint: Peritonitis    S: GKKYQP results for input(s): TROP, CK in the last 168 hours. Imaging: Imaging data reviewed in Epic. Medications:   • Insulin Aspart Pen  2-10 Units Subcutaneous 4 times per day   • levETIRAcetam  500 mg Intravenous Q12H       ASSESSMENT / PLAN:     1.

## 2018-09-07 NOTE — CM/SW NOTE
SW spoke w/[facility]. Curt Chatman stated the pt does not meet criteria for GIP. Curt Chatman stated the family does not feel comfortable taking the pt home. Pt is currently on comfort care.

## 2018-09-07 NOTE — PLAN OF CARE
GENITOURINARY - ADULT    • Absence of urinary retention Not Progressing          METABOLIC/FLUID AND ELECTROLYTES - ADULT    • Glucose maintained within prescribed range Progressing        RESPIRATORY - ADULT    • Achieves optimal ventilation and oxygenati

## 2018-09-07 NOTE — CM/SW NOTE
PATI received an order stating the pt's family would like to meet w/[facility]. PATI sent referral via [address]. SW to follow up regarding meeting time.

## 2018-09-07 NOTE — PROGRESS NOTES
[facility]  Progress Note    Franko Stiles Patient Status:  Inpatient    1935 MRN [id number]   [facility] 4NW-A Attending Sage Varela MD   Hosp Day # 3 PCP Lexi Vazquez MD     Subjective:  Franko Stiles is a(n) 80 Neuropathy     Pacemaker     Unspecified vitamin D deficiency     Urinary incontinence     Distal radius fracture     Wrist pain     Contusion of wrist     Osteoarthrosis, unspecified whether generalized or localized, hand     Osteoarthrosis, unspecified w hypertension  7. Hyperlipidemia  8.  Progressive dementia    Focus now on comfort with pain medication and Ativan as needed discussed with family at bedside  And palliative care is following  Will follow peripherally please call if can be of benefit    Ther

## 2018-09-07 NOTE — PLAN OF CARE
Rounded on patient,patient just woke up family is requesting for more morphine. Dose request too soon for every 2H PRN dose. Offered to turn patient but patient's family refused,wants patient turned after morphine dose. Patient with no restlessness noted. No S

## 2018-09-07 NOTE — PLAN OF CARE
CARDIOVASCULAR - ADULT    • Maintains optimal cardiac output and hemodynamic stability Progressing        Delirium    • Minimize duration of delirium Progressing        GENITOURINARY - ADULT    • Absence of urinary retention Progressing        METABOLIC/FL

## 2018-09-08 PROBLEM — Z51.5 PALLIATIVE CARE PATIENT: Status: ACTIVE | Noted: 2018-01-01

## 2018-09-08 NOTE — PLAN OF CARE
COPING    • Pt/Family able to verbalize concerns and demonstrate effective coping strategies Progressing        DEATH & DYING    • Pt/Family communicate acceptance of impending death and feel psychological comfort and peace Progressing        Patient moved

## 2018-09-08 NOTE — CM/SW NOTE
Call from [address]  with [facility] hospice, she is not able to do one-time contract for GIP with [facility] due to documentation issues with Epic at this time.    I spoke with my supervisor Jonathan Perea, options are for pt to remain comfort care or family can o

## 2018-09-08 NOTE — PROGRESS NOTES
[facility]  Progress Note     Demar Somers Patient Status:  Inpatient    1935 MRN [id number]   [facility] 4SW-A Attending  Samara Monsalve MD   Hosp Day # 4 PCP Paty Carranza MD     Chief Complaint: Peritonitis    S: Laverle Basket Acidosis  7. Hyponatremia  8. DM2  9. Advanced Dementia  10.  Seizure disorder:  Continue Keppra    At this time, family electing for comfort care only; they want hospice care, and would prefer to keep her here at [facility].  I think this is reasonabl

## 2018-09-08 NOTE — PLAN OF CARE
Patient warm to touch,last temp 99. 2. Family doesn't want any vital signs monitoring at this time nor repositioning. Patient appears comfortable lying on her back. Mcculre cath intact with diminished urine. BLE cold to touch.

## 2018-09-08 NOTE — PLAN OF CARE
GENITOURINARY - ADULT    • Absence of urinary retention Not Progressing          METABOLIC/FLUID AND ELECTROLYTES - ADULT    • Glucose maintained within prescribed range Not Progressing    • Electrolytes maintained within normal limits Not Progressing    •

## 2018-09-08 NOTE — HOSPICE RN NOTE
Admitted to [facility], GIP  with the help of charge nurse [address]into. [facility] aware of [facility]IP admit. Nurse Pancho aware of GIP admit. Orders per Dr Sage Ritchie, who requested GIP admit to [facility]. Pt not stable to transfer to [facility].

## 2018-09-08 NOTE — PLAN OF CARE
Patient is lethargic,opens eyes when stimulated. BP 78/57, HR 42; RR 24 SPO2 78%. No moaning & grimacing noted. Appears comfortable. Mcclure cath intact,scant urine output. Skin warm to touch. [facility] here to re eval patient.

## 2018-09-09 NOTE — PROGRESS NOTES
[facility]  Progress Note     Warren Haque Patient Status:  Inpatient    1935 MRN [id number]   [facility] 4SW-A Attending  Kvng Perez MD   Hosp Day # 1 PCP Deyvi Sanders MD     Chief Complaint: Peritonitis    S: Charlotte Butler want hospice care, and would prefer to keep her here at [facility].  I think this is reasonable, as she appears too ill to transfer. I think she likely has <2-3 days to live, given current condition, bacteremia and need for morphine pain medications.

## 2018-09-09 NOTE — PLAN OF CARE
Pt is not responsive for verbal or painful stimuli. Remains on Room air. This morning pt breathing was Labored and breathing more rapidly. Spoke to pt's daughter and Morphine given with Family's consent. Pt breathing was better after morphine.  Both feet is

## 2018-09-09 NOTE — DISCHARGE SUMMARY
[facility]  DISCHARGE SUMMARY     Patricia Lopes Patient Status:  Inpatient    1935 MRN [id number]   [facility] 4NW-A Attending Krystin Solorio MD   Hosp Day # 1 PCP Vipul Huerta MD     Date of Admission: 2018  Date 25 MG Tabs  Commonly known as:  LOPRESSOR      Take 50 mg by mouth daily. Refills:  0            [place]    Follow-up appointment:   No follow-up provider specified.     Vital signs:  Temp:  [98.2 °F (36.8 °C)-99.8 °F (37.7 °C)] 98.2 °F (36.8 °

## 2018-09-09 NOTE — PLAN OF CARE
COPING    • Pt/Family able to verbalize concerns and demonstrate effective coping strategies Progressing        DEATH & DYING    • Pt/Family communicate acceptance of impending death and feel psychological comfort and peace Progressing        Pt lethargic,

## 2018-09-09 NOTE — H&P
[facility]  History and Physical     Clark Cueva Patient Status:  Inpatient    1935 MRN [id number]   [facility] 4NW-A Attending Loida Thrasher MD   Hosp Day # 1 PCP Esmer Castillo MD     Chief Complaint: hospice times daily with meals. Disp:  Rfl:    Metoprolol Tartrate 25 MG Oral Tab Take 50 mg by mouth daily.    Disp:  Rfl:        Review of Systems:   Did not solicit    Physical Exam:    BP (!) 78/41 (BP Location: Right arm)   Pulse (!) 25   Temp 98.2 °F (36.8

## 2018-09-10 NOTE — PLAN OF CARE
COPING    • Pt/Family able to verbalize concerns and demonstrate effective coping strategies Progressing        DEATH & DYING    • Pt/Family communicate acceptance of impending death and feel psychological comfort and peace Progressing            RECEIVED

## 2018-09-10 NOTE — PLAN OF CARE
NURSING DISCHARGE NOTE    Discharged Other, (see nursing note) via Cart. Accompanied by Support staff  Belongings Taken by patient/family. Pt is . Pt family took all the belongings.

## 2018-09-10 NOTE — PLAN OF CARE
Pt was unresponsive, At 0910 hours, when assessing pt , Pt stopped breathing. No Pulse, No respiration and Pupils Dilated. Notified Family at bedside about the status of the pt. Notified Charge Nurse. Pronounced Death at 0910 hours. Notified Delmy Chaves

## 2018-09-10 NOTE — DISCHARGE SUMMARY
[facility]  DISCHARGE SUMMARY     Patricia Lopes Patient Status:  Inpatient    1935 MRN [id number]   [facility] 4NW-A Attending No att. providers found   Hosp Day # 2 PCP Vipul Huerta MD     Date of Admission: 2018

## 2024-07-22 NOTE — PALLIATIVE CARE NOTE
[address] Follow Up      Lolita Mello  [id number]       Patient seen and evaluated, family (three children and grandsons) at bedside. Hospice evaluation from yesterday noted.     ROS: patient is unable to provide    Phys PA for Rx Spiriva Caps approved from 1/1/24-7/22/25.